# Patient Record
Sex: FEMALE | Race: WHITE | NOT HISPANIC OR LATINO | Employment: FULL TIME | URBAN - METROPOLITAN AREA
[De-identification: names, ages, dates, MRNs, and addresses within clinical notes are randomized per-mention and may not be internally consistent; named-entity substitution may affect disease eponyms.]

---

## 2023-09-13 PROCEDURE — 87077 CULTURE AEROBIC IDENTIFY: CPT | Performed by: FAMILY MEDICINE

## 2023-09-13 PROCEDURE — 87086 URINE CULTURE/COLONY COUNT: CPT | Performed by: FAMILY MEDICINE

## 2023-09-13 PROCEDURE — 87186 SC STD MICRODIL/AGAR DIL: CPT | Performed by: FAMILY MEDICINE

## 2023-09-20 ENCOUNTER — OFFICE VISIT (OUTPATIENT)
Dept: OBSTETRICS AND GYNECOLOGY | Facility: CLINIC | Age: 28
End: 2023-09-20
Payer: COMMERCIAL

## 2023-09-20 ENCOUNTER — PATIENT ROUNDING (BHMG ONLY) (OUTPATIENT)
Dept: OBSTETRICS AND GYNECOLOGY | Facility: CLINIC | Age: 28
End: 2023-09-20
Payer: COMMERCIAL

## 2023-09-20 VITALS
BODY MASS INDEX: 23 KG/M2 | SYSTOLIC BLOOD PRESSURE: 116 MMHG | HEIGHT: 62 IN | DIASTOLIC BLOOD PRESSURE: 80 MMHG | WEIGHT: 125 LBS

## 2023-09-20 DIAGNOSIS — Z31.9 INFERTILITY MANAGEMENT: ICD-10-CM

## 2023-09-20 DIAGNOSIS — Z12.4 SCREENING FOR CERVICAL CANCER: Primary | ICD-10-CM

## 2023-09-20 DIAGNOSIS — Z01.419 WOMEN'S ANNUAL ROUTINE GYNECOLOGICAL EXAMINATION: ICD-10-CM

## 2023-09-20 RX ORDER — PRENATAL VIT,CAL 76/IRON/FOLIC 29 MG-1 MG
1 TABLET ORAL DAILY
Qty: 30 TABLET | Refills: 11 | Status: SHIPPED | OUTPATIENT
Start: 2023-09-20 | End: 2024-09-19

## 2023-09-20 NOTE — PROGRESS NOTES
Gynecologic Annual Exam Note        Gynecologic Exam        Subjective     HPI  Jennifer REYNOLDS is a 28 y.o.  female who presents for annual well woman exam as a new patient. . Patient reports problems with: Patient is currently TTC without success. She has been off birth control for one year and four months and actively trying to get pregnant. . Patient's last menstrual period was 2023 (exact date).. Her periods occur every 25-35 days , lasting 5 days. The flow is moderate.. She reports dysmenorrhea is mild, occurring first  premenstrual and 1-2 days of flow. Partner Status: Marital Status: .  She is sexually active. She has not had new partners.. STD testing recommendations have been explained to the patient and she does not desire STD testing.    Patient states she has been trying to conceive for about 1-1/2 years.  She states her menses have been regular every 26 to 27 days with no dysmenorrhea or dyspareunia.  She has had ovulation tests that are always positive.  Her  is 28 years of age and denies any medical problems.    Additional OB/GYN History   Current contraception: contraceptive methods: None  Desires to: do not start contraception  Thromboembolic Disease: none  Age of menarche: 16    History of STD: no    Last Pap : She states she was due last year and forgot. Results: unknown . HPV: unknown .   Last Completed Pap Smear       This patient has no relevant Health Maintenance data.             History of abnormal Pap smear: no  Gardasil status:completed  Family history of uterine, colon, breast, or ovarian cancer: no  Performs monthly Self-Breast Exam: yes  Exercises Regularly:no  Feelings of Anxiety or Depression: no  Tobacco Usage?: No       Current Outpatient Medications:     sulfamethoxazole-trimethoprim (BACTRIM DS,SEPTRA DS) 800-160 MG per tablet, Take 1 tablet by mouth 2 (Two) Times a Day for 10 days., Disp: 20 tablet, Rfl: 0    prenatal vitamins (PRENATABS RX) 29-1 MG  "tablet tablet, Take 1 tablet by mouth Daily., Disp: 30 tablet, Rfl: 11     Patient denies the need for medication refills today.    OB History          0    Para   0    Term   0       0    AB   0    Living   0         SAB   0    IAB   0    Ectopic   0    Molar   0    Multiple   0    Live Births   0                Health Maintenance   Topic Date Due    Annual Gynecologic Pelvic and Breast Exam  Never done    COVID-19 Vaccine (1) Never done    HPV VACCINES (1 - 2-dose series) Never done    TDAP/TD VACCINES (1 - Tdap) Never done    HEPATITIS C SCREENING  Never done    ANNUAL PHYSICAL  Never done    PAP SMEAR  Never done    INFLUENZA VACCINE  10/01/2023    Pneumococcal Vaccine 0-64  Aged Out       History reviewed. No pertinent past medical history.     Past Surgical History:   Procedure Laterality Date    WISDOM TOOTH EXTRACTION         The additional following portions of the patient's history were reviewed and updated as appropriate: allergies, current medications, past family history, past medical history, past social history, past surgical history, and problem list.    Review of Systems   Constitutional: Negative.    HENT: Negative.     Eyes: Negative.    Respiratory: Negative.     Cardiovascular: Negative.    Gastrointestinal: Negative.    Endocrine: Negative.    Genitourinary:         Infertility    Musculoskeletal: Negative.    Skin: Negative.    Allergic/Immunologic: Negative.    Neurological: Negative.    Hematological: Negative.    Psychiatric/Behavioral: Negative.         I have reviewed and agree with the HPI, ROS, and historical information as entered above.   Ismael De La Cruz MD          Objective   /80 (BP Location: Right arm, Patient Position: Sitting, Cuff Size: Adult)   Ht 157.5 cm (62\")   Wt 56.7 kg (125 lb)   LMP 2023 (Exact Date)   BMI 22.86 kg/m²     Physical Exam  Vitals and nursing note reviewed. Exam conducted with a chaperone present.   Constitutional:       " Appearance: Normal appearance. She is well-developed and normal weight.   HENT:      Head: Normocephalic and atraumatic.   Neck:      Thyroid: No thyroid mass or thyromegaly.   Cardiovascular:      Rate and Rhythm: Normal rate and regular rhythm.      Heart sounds: Normal heart sounds. No murmur heard.  Pulmonary:      Effort: Pulmonary effort is normal. No retractions.      Breath sounds: Normal breath sounds. No wheezing, rhonchi or rales.   Chest:      Chest wall: No mass or tenderness.   Breasts:     Right: Normal. No mass, nipple discharge, skin change or tenderness.      Left: Normal. No mass, nipple discharge, skin change or tenderness.   Abdominal:      General: Bowel sounds are normal.      Palpations: Abdomen is soft. Abdomen is not rigid. There is no mass.      Tenderness: There is no abdominal tenderness. There is no guarding.      Hernia: No hernia is present. There is no hernia in the left inguinal area.   Genitourinary:     General: Normal vulva.      Exam position: Lithotomy position.      Labia:         Right: No rash, tenderness or lesion.         Left: No rash, tenderness or lesion.       Vagina: Normal. No vaginal discharge or lesions.      Cervix: No cervical motion tenderness, discharge, lesion or cervical bleeding.      Uterus: Normal. Not enlarged, not fixed and not tender.       Adnexa:         Right: No mass or tenderness.          Left: No mass or tenderness.        Rectum: No external hemorrhoid.      Comments: No vaginal discharge.  Cervix out lesions or discharge.  Uterus normal size nontender mid position.  Ovaries nonenlarged and nontender.  Musculoskeletal:      Cervical back: Normal range of motion. No muscular tenderness.   Neurological:      Mental Status: She is alert and oriented to person, place, and time.   Psychiatric:         Behavior: Behavior normal.          Assessment and Plan    Problem List Items Addressed This Visit       Screening for cervical cancer - Primary     Overview     Last Pap smear and dental in 2021?    9/20/2023 Pap smear with HPV screen done.         Infertility management    Overview     9/20/2023 trying for 1.5 yrs.  Regular menses; q 26-27; Positive LH tests.   No h/o PID/ chlamydia.  No dysmenorrhea;   Needs S/A         Relevant Orders    Semen Analysis - Semen, Voided       GYN annual well woman exam.  28 years of age.  Menses every 26 to 27 days.  Reviewed pap guidelines.  Pap with HPV screen done.  If negative can repeat in 3 years.  History of infertility x1.5 years.  Appears to be ovulating monthly.  Check semen analysis.  Reviewed monthly self breast exams.  Instructed to call with lumps, pain, or breast discharge.    Reviewed exercise as a preventative health measures.   Preconceptual Counseling - Discussed cystic fibrosis screening, rubella screening, chicken pox immunity, folic acid supplementation and HIV screening.   Reccommended Flu Vaccine in Fall of each year.  RTC in 1 year or PRN with problems  Rx for prenatal vitamins sent to pharmacy.  Return in about 6 months (around 3/20/2024) for Next scheduled follow up.      Ismael De La Cruz MD  09/20/2023

## 2023-09-20 NOTE — PROGRESS NOTES
September 20, 2023    Hello, may I speak with Jennifer REYNOLDS?    My name is MARTHA      I am  with [facility]   [facility]  [address]  formerly [facility] [address] [phone].    Before we get started may I verify your date of birth? 1995    I am calling to officially welcome you to our practice and ask about your recent visit. Is this a good time to talk? yes    Tell me about your visit with us. What things went well?  EVERYTHING WAS GREAT       We're always looking for ways to make our patients' experiences even better. Do you have recommendations on ways we may improve?  no    Overall were you satisfied with your first visit to our practice? yes       I appreciate you taking the time to speak with me today. Is there anything else I can do for you? no      Thank you, and have a great day.

## 2023-09-21 LAB — REF LAB TEST METHOD: NORMAL

## 2023-11-28 ENCOUNTER — TELEPHONE (OUTPATIENT)
Dept: OBSTETRICS AND GYNECOLOGY | Facility: CLINIC | Age: 28
End: 2023-11-28
Payer: COMMERCIAL

## 2023-11-29 ENCOUNTER — INITIAL PRENATAL (OUTPATIENT)
Dept: OBSTETRICS AND GYNECOLOGY | Facility: CLINIC | Age: 28
End: 2023-11-29
Payer: COMMERCIAL

## 2023-11-29 ENCOUNTER — TELEPHONE (OUTPATIENT)
Dept: OBSTETRICS AND GYNECOLOGY | Facility: CLINIC | Age: 28
End: 2023-11-29

## 2023-11-29 VITALS — DIASTOLIC BLOOD PRESSURE: 62 MMHG | WEIGHT: 126 LBS | SYSTOLIC BLOOD PRESSURE: 98 MMHG | BODY MASS INDEX: 23.05 KG/M2

## 2023-11-29 DIAGNOSIS — Z34.91 PRENATAL CARE IN FIRST TRIMESTER: Primary | ICD-10-CM

## 2023-11-29 DIAGNOSIS — Z12.4 SCREENING FOR CERVICAL CANCER: ICD-10-CM

## 2023-11-29 DIAGNOSIS — Z11.8 SCREENING FOR CHLAMYDIAL DISEASE: ICD-10-CM

## 2023-11-29 DIAGNOSIS — Z34.90 PREGNANCY, UNSPECIFIED GESTATIONAL AGE: ICD-10-CM

## 2023-11-29 PROBLEM — O21.9 NAUSEA/VOMITING IN PREGNANCY: Status: ACTIVE | Noted: 2023-11-29

## 2023-11-29 PROCEDURE — 0501F PRENATAL FLOW SHEET: CPT | Performed by: OBSTETRICS & GYNECOLOGY

## 2023-11-29 RX ORDER — PROMETHAZINE HYDROCHLORIDE 25 MG/1
25 TABLET ORAL EVERY 6 HOURS PRN
Qty: 25 TABLET | Refills: 4 | Status: SHIPPED | OUTPATIENT
Start: 2023-11-29

## 2023-11-29 RX ORDER — DOXYLAMINE SUCCINATE AND PYRIDOXINE HYDROCHLORIDE, DELAYED RELEASE TABLETS 10 MG/10 MG 10; 10 MG/1; MG/1
2 TABLET, DELAYED RELEASE ORAL NIGHTLY PRN
Qty: 60 TABLET | Refills: 3 | Status: SHIPPED | OUTPATIENT
Start: 2023-11-29

## 2023-11-29 RX ORDER — VITAMIN C, CALCIUM, IRON, VITAMIN D3, VITAMIN E, THIAMIN, RIBOFLAVIN, NIACINAMIDE, VITAMIN B6, FOLIC ACID, IODINE, ZINC, COPPER, DOCUSATE SODIUM
1 KIT DAILY
Qty: 30 TABLET | Refills: 11 | Status: SHIPPED | OUTPATIENT
Start: 2023-11-29 | End: 2024-11-28

## 2023-11-29 NOTE — TELEPHONE ENCOUNTER
Generic Diclegis not on pt's formulary - PA pending. Update: Per CMM the request was approved for the following time period: 11/29/2023 - 11/29/2024

## 2023-11-29 NOTE — PROGRESS NOTES
Initial ob visit     CC- Here for care of pregnancy        Jennifer REYNOLDS is a 28 y.o. female, , who presents for her first obstetrical visit.  Her last LMP was Patient's last menstrual period was 10/06/2023.. Her NOLBERTO is 2024, by Last Menstrual Period. Current GA is 7w5d.     Initial positive test date : 10-28-23, UPT        Her periods are: regular when not pregnant  Prior obstetric issues: none  Patient's past medical history is significant for:  none .  Family history of genetic issues (includes FOB): none  Prior infections concerning in pregnancy (Rash, fever in last 2 weeks): No  Varicella Hx - unknown   Prior testing for Cystic Fibrosis Carrier or Sickle Cell Trait- no  Prepregnancy BMI - Body mass index is 23.05 kg/m².  History of STD: no  Hx of HSV for patient or partner: no  Ultrasound Today: yes    OB History    Para Term  AB Living   1 0 0 0 0 0   SAB IAB Ectopic Molar Multiple Live Births   0 0 0 0 0 0      # Outcome Date GA Lbr Joss/2nd Weight Sex Delivery Anes PTL Lv   1 Current                Additional Pertinent History   Last Pap : 23 Result: negative HPV: negative     Last Completed Pap Smear            PAP SMEAR (Every 3 Years) Next due on 2023  LIQUID-BASED PAP SMEAR WITH HPV GENOTYPING REGARDLESS OF INTERPRETATION (STEPHEN,COR,MAD)                  History of abnormal Pap smear: no  Family history of uterine, colon, breast, or ovarian cancer: no  Feelings of Anxiety or Depression: no  Tobacco Usage?: No   Alcohol/Drug Use?: NO  Over the age of 35 at delivery: no  Desires Genetic Screening: [id number] with gender  Flu Status:  currently out of flu vaccine    PMH    Current Outpatient Medications:     prenatal vitamins (PRENATABS RX) 29-1 MG tablet tablet, Take 1 tablet by mouth Daily., Disp: 30 tablet, Rfl: 11    doxylamine-pyridoxine (DICLEGIS) 10-10 MG tablet delayed-release EC tablet, Take 2 tablets by mouth At Night As Needed (hyperemesis).,  Disp: 60 tablet, Rfl: 3    Prenat w/o A-FeCbGl-DSS-FA-DHA (CitraNatal DHA) 27-1 & 250 MG tablet, Take 1 tablet by mouth Daily., Disp: 30 tablet, Rfl: 11    promethazine (PHENERGAN) 25 MG tablet, Take 1 tablet by mouth Every 6 (Six) Hours As Needed for Nausea or Vomiting., Disp: 25 tablet, Rfl: 4     History reviewed. No pertinent past medical history.     Past Surgical History:   Procedure Laterality Date    WISDOM TOOTH EXTRACTION         Review of Systems   Review of Systems    Patient Reports: Nausea and vomiting and Fatigue  Patient Denies: Spotting, Heavy bleeding, and Cramping  All systems reviewed and otherwise normal.    I have reviewed and agree with the HPI, ROS, and historical information as entered above.   Ismael De La Cruz MD      BP 98/62   Wt 57.2 kg (126 lb)   LMP 10/06/2023   BMI 23.05 kg/m²     The additional following portions of the patient's history were reviewed and updated as appropriate: allergies, current medications, past family history, past medical history, past social history, past surgical history, and problem list.    Physical Exam  General:  well developed; well nourished  no acute distress   Chest/Respiratory: No labored breathing, normal respiratory effort, normal appearance, no respiratory noises noted  CHEST/RESPIRATORY: clear to auscultation, no wheezes, rales, or rhonchi, no tachypnea, retractions, or cyanosis   Heart:  normal rate, regular rhythm,  no murmurs, rubs, or gallops   Thyroid: normal to inspection and palpation   Breasts:  Not performed.   Abdomen: soft, non-tender; no masses  no umbilical or inguinal hernias are present   Pelvis: Not performed.        Assessment and Plan    Problem List Items Addressed This Visit          11/23    Pregnancy    Overview     28-year-old G1, P0.    Dates consistent with 7-week ultrasound.  Desires cell free DNA screening.            Other    Screening for cervical cancer    Overview     Last Pap smear and dental in 2021?    Pap  smear 9/20/2023 was negative.  HPV high-risk Pool negative.          Other Visit Diagnoses       Prenatal care in first trimester    -  Primary    Relevant Orders    Obstetric Panel    HIV-1 / O / 2 Ag / Antibody    Urine Culture - Urine, Urine, Clean Catch    Urinalysis With Microscopic - Urine, Clean Catch    Chlamydia trachomatis, Neisseria gonorrhoeae, PCR - Urine, Urine, Clean Catch    Urine Drug Screen - Urine, Clean Catch    Screening for chlamydial disease                Pregnancy at 7w5d; dates consistent with ultrasound today.  Nausea vomiting pregnancy.  Rx for Diclegis and Phenergan sent to pharmacy.  Desires cell free DNA screening next visit.  Rx for prenatal vitamins sent to pharmacy.  Reviewed routine prenatal care with the office and educational materials given  Lab(s) Ordered  Discussed options for genetic testing including first trimester nuchal translucency screen, genetic disease carrier testing, quadruple screen, and NIPT  Medication(s) Ordered  Nausea/Vomiting - desires medication.  Options discussed and encouraged to be proactive to avoid constipation if on Zofran.  Patient is on Prenatal vitamins  Return in about 4 weeks (around 12/27/2023) for Next scheduled follow up.      Ismael De La Cruz MD  11/29/2023

## 2023-11-30 LAB
ABO GROUP BLD: NORMAL
AMPHETAMINES UR QL SCN: NEGATIVE NG/ML
APPEARANCE UR: ABNORMAL
BACTERIA #/AREA URNS HPF: ABNORMAL /[HPF]
BARBITURATES UR QL SCN: NEGATIVE NG/ML
BASOPHILS # BLD AUTO: 0 X10E3/UL (ref 0–0.2)
BASOPHILS NFR BLD AUTO: 0 %
BENZODIAZ UR QL SCN: NEGATIVE NG/ML
BILIRUB UR QL STRIP: NEGATIVE
BLD GP AB SCN SERPL QL: NEGATIVE
BZE UR QL SCN: NEGATIVE NG/ML
CANNABINOIDS UR QL SCN: NEGATIVE NG/ML
CASTS URNS QL MICRO: ABNORMAL /LPF
COLOR UR: YELLOW
CREAT UR-MCNC: 271.8 MG/DL (ref 20–300)
CRYSTALS URNS MICRO: ABNORMAL
EOSINOPHIL # BLD AUTO: 0.1 X10E3/UL (ref 0–0.4)
EOSINOPHIL NFR BLD AUTO: 1 %
EPI CELLS #/AREA URNS HPF: >10 /HPF (ref 0–10)
ERYTHROCYTE [DISTWIDTH] IN BLOOD BY AUTOMATED COUNT: 12.2 % (ref 11.7–15.4)
GLUCOSE UR QL STRIP: NEGATIVE
HBV SURFACE AG SERPL QL IA: NEGATIVE
HCT VFR BLD AUTO: 40.6 % (ref 34–46.6)
HCV IGG SERPL QL IA: NON REACTIVE
HGB BLD-MCNC: 13.6 G/DL (ref 11.1–15.9)
HGB UR QL STRIP: NEGATIVE
HIV 1+2 AB+HIV1 P24 AG SERPL QL IA: NON REACTIVE
IMM GRANULOCYTES # BLD AUTO: 0 X10E3/UL (ref 0–0.1)
IMM GRANULOCYTES NFR BLD AUTO: 0 %
KETONES UR QL STRIP: ABNORMAL
LABORATORY COMMENT REPORT: NORMAL
LEUKOCYTE ESTERASE UR QL STRIP: ABNORMAL
LYMPHOCYTES # BLD AUTO: 2 X10E3/UL (ref 0.7–3.1)
LYMPHOCYTES NFR BLD AUTO: 21 %
MCH RBC QN AUTO: 30.3 PG (ref 26.6–33)
MCHC RBC AUTO-ENTMCNC: 33.5 G/DL (ref 31.5–35.7)
MCV RBC AUTO: 90 FL (ref 79–97)
METHADONE UR QL SCN: NEGATIVE NG/ML
MICRO URNS: ABNORMAL
MONOCYTES # BLD AUTO: 0.6 X10E3/UL (ref 0.1–0.9)
MONOCYTES NFR BLD AUTO: 6 %
MUCOUS THREADS URNS QL MICRO: PRESENT
NEUTROPHILS # BLD AUTO: 6.5 X10E3/UL (ref 1.4–7)
NEUTROPHILS NFR BLD AUTO: 72 %
NITRITE UR QL STRIP: NEGATIVE
OPIATES UR QL SCN: NEGATIVE NG/ML
OXYCODONE+OXYMORPHONE UR QL SCN: NEGATIVE NG/ML
PCP UR QL: NEGATIVE NG/ML
PH UR STRIP: 6.5 [PH] (ref 5–7.5)
PH UR: 6.6 [PH] (ref 4.5–8.9)
PLATELET # BLD AUTO: 333 X10E3/UL (ref 150–450)
PROPOXYPH UR QL SCN: NEGATIVE NG/ML
PROT UR QL STRIP: ABNORMAL
RBC # BLD AUTO: 4.49 X10E6/UL (ref 3.77–5.28)
RBC #/AREA URNS HPF: ABNORMAL /HPF (ref 0–2)
RH BLD: POSITIVE
RPR SER QL: NON REACTIVE
RUBV IGG SERPL IA-ACNC: 9.66 INDEX
SP GR UR STRIP: 1.03 (ref 1–1.03)
UNIDENT CRYS URNS QL MICRO: PRESENT
UROBILINOGEN UR STRIP-MCNC: 0.2 MG/DL (ref 0.2–1)
WBC # BLD AUTO: 9.2 X10E3/UL (ref 3.4–10.8)
WBC #/AREA URNS HPF: ABNORMAL /HPF (ref 0–5)

## 2023-12-01 LAB
BACTERIA UR CULT: NO GROWTH
BACTERIA UR CULT: NORMAL

## 2023-12-02 LAB
C TRACH RRNA SPEC QL NAA+PROBE: NEGATIVE
N GONORRHOEA RRNA SPEC QL NAA+PROBE: NEGATIVE

## 2023-12-07 ENCOUNTER — TELEPHONE (OUTPATIENT)
Dept: OBSTETRICS AND GYNECOLOGY | Facility: CLINIC | Age: 28
End: 2023-12-07
Payer: COMMERCIAL

## 2023-12-07 NOTE — TELEPHONE ENCOUNTER
Caller: Jennifer REYNOLDS    Relationship: Self    Best call back number: [phone] (home)      What orders are you requesting (i.e. lab or imaging): LABS    In what timeframe would the patient need to come in: 12-    Where will you receive your lab/imaging services: OFFICE     Additional notes: PT IS PLANNING ON COMING TOMORROW FOR GENDER LABS WOULD LIKE TO MAKE SURE ORDERS ARE PUT IN.  THANK YOU.

## 2023-12-08 ENCOUNTER — LAB (OUTPATIENT)
Dept: OBSTETRICS AND GYNECOLOGY | Facility: CLINIC | Age: 28
End: 2023-12-08
Payer: COMMERCIAL

## 2023-12-08 DIAGNOSIS — Z34.90 PREGNANCY, UNSPECIFIED GESTATIONAL AGE: Primary | ICD-10-CM

## 2023-12-14 ENCOUNTER — TELEPHONE (OUTPATIENT)
Dept: OBSTETRICS AND GYNECOLOGY | Facility: CLINIC | Age: 28
End: 2023-12-14
Payer: COMMERCIAL

## 2023-12-14 NOTE — TELEPHONE ENCOUNTER
Patient calling to discuss when she should expect the Materni 21 results back had drawn on 12/08 notified it can take up to 12 business days

## 2023-12-14 NOTE — TELEPHONE ENCOUNTER
Returned patient's call. Informed her that we will contact her when results are available. She v/u and agreed.

## 2023-12-15 ENCOUNTER — TELEPHONE (OUTPATIENT)
Dept: OBSTETRICS AND GYNECOLOGY | Facility: CLINIC | Age: 28
End: 2023-12-15
Payer: COMMERCIAL

## 2023-12-15 NOTE — TELEPHONE ENCOUNTER
Contacted by Dr. Simons to place orders for CXR, CBC and Covid PCR prior to appointment with Dr. Rosario.    Pt gave VU of results

## 2023-12-27 ENCOUNTER — ROUTINE PRENATAL (OUTPATIENT)
Dept: OBSTETRICS AND GYNECOLOGY | Facility: CLINIC | Age: 28
End: 2023-12-27
Payer: COMMERCIAL

## 2023-12-27 VITALS — DIASTOLIC BLOOD PRESSURE: 78 MMHG | SYSTOLIC BLOOD PRESSURE: 116 MMHG | BODY MASS INDEX: 22.24 KG/M2 | WEIGHT: 121.6 LBS

## 2023-12-27 DIAGNOSIS — Z34.91 PRENATAL CARE IN FIRST TRIMESTER: Primary | ICD-10-CM

## 2023-12-27 DIAGNOSIS — Z34.90 PREGNANCY, UNSPECIFIED GESTATIONAL AGE: ICD-10-CM

## 2023-12-27 DIAGNOSIS — O21.9 NAUSEA/VOMITING IN PREGNANCY: ICD-10-CM

## 2023-12-27 DIAGNOSIS — Z12.4 SCREENING FOR CERVICAL CANCER: ICD-10-CM

## 2023-12-27 LAB
GLUCOSE UR STRIP-MCNC: NEGATIVE MG/DL
PROT UR STRIP-MCNC: NEGATIVE MG/DL

## 2023-12-27 RX ORDER — CALCIUM CARBONATE 500 MG/1
1 TABLET, CHEWABLE ORAL DAILY
COMMUNITY

## 2023-12-27 RX ORDER — ACETAMINOPHEN 500 MG
500 TABLET ORAL EVERY 6 HOURS PRN
COMMUNITY

## 2023-12-27 NOTE — PROGRESS NOTES
OB FOLLOW UP  CC- Here for care of pregnancy        Jennifer REYNOLDS is a 28 y.o.  11w5d patient being seen today for her obstetrical follow up visit. Patient reports severe headaches that make her nauseous. Tylenol sometimes help but not most of the time. She has been vomiting, phenergan helps.     Her prenatal care is complicated by (and status) :   Patient Active Problem List   Diagnosis    Screening for cervical cancer    Infertility management    Pregnancy    Nausea/vomiting in pregnancy       Desires genetic testing?: Yes with Gender-completed  Flu Status: Will give in office today  Ultrasound Today: No    ROS -   Patient Reports : see above  Patient Denies: Loss of Fluid, Vaginal Spotting, and Vision Changes  Fetal Movement :  too early  All other systems reviewed and are negative.     The additional following portions of the patient's history were reviewed and updated as appropriate: allergies, current medications, past family history, past medical history, past social history, past surgical history, and problem list.    I have reviewed and agree with the HPI, ROS, and historical information as entered above.   Ismael De La Cruz MD          /78   Wt 55.2 kg (121 lb 9.6 oz)   LMP 10/06/2023   BMI 22.24 kg/m²         EXAM:     Prenatal Vitals  BP: 116/78  Weight: 55.2 kg (121 lb 9.6 oz)   Fetal Heart Rate: 166                  Assessment and Plan    Problem List Items Addressed This Visit              Nausea/vomiting in pregnancy    Overview     Rx for Phenergan and Diclegis sent to pharmacy.         Pregnancy    Overview     28-year-old G1, P0.    : Ismael  Dates consistent with 7-week ultrasound.  Cell free DNA screen was low risk and consistent with male.            Other    Screening for cervical cancer    Overview     Last Pap smear and dental in ?    Pap smear 2023 was negative.  HPV high-risk Pool negative.          Other Visit Diagnoses       Prenatal care in  first trimester    -  Primary    Relevant Orders    POC Urinalysis Dipstick    Fluzone (or Fluarix & Flulaval for VFC) >6 Mos (5560-3024) (Completed)            Pregnancy at 11w5d; AFP screen next visit.  Nausea vomiting improving.  Declines Rx for Zofran.  Did not get Diclegis filled.  Labs reviewed from New OB Visit.  Counseled on genetic testing, carrier status and option for NT screen  Activity and Exercise discussed.  Nausea/Vomiting - desires medication.  Options discussed of Diclegis/Bonjesta, Promethazine, and Zofran  Patient is on Prenatal vitamins  Return in about 4 weeks (around 1/24/2024) for Next scheduled follow up.    Ismael De La Cruz MD  12/27/2023

## 2024-01-22 ENCOUNTER — ROUTINE PRENATAL (OUTPATIENT)
Dept: OBSTETRICS AND GYNECOLOGY | Facility: CLINIC | Age: 29
End: 2024-01-22
Payer: COMMERCIAL

## 2024-01-22 VITALS — WEIGHT: 121.2 LBS | DIASTOLIC BLOOD PRESSURE: 64 MMHG | BODY MASS INDEX: 22.17 KG/M2 | SYSTOLIC BLOOD PRESSURE: 102 MMHG

## 2024-01-22 DIAGNOSIS — Z12.4 SCREENING FOR CERVICAL CANCER: ICD-10-CM

## 2024-01-22 DIAGNOSIS — O21.9 NAUSEA/VOMITING IN PREGNANCY: ICD-10-CM

## 2024-01-22 DIAGNOSIS — Z3A.15 15 WEEKS GESTATION OF PREGNANCY: Primary | ICD-10-CM

## 2024-01-22 LAB
EXPIRATION DATE: 0
GLUCOSE UR STRIP-MCNC: NEGATIVE MG/DL
Lab: 0
PROT UR STRIP-MCNC: NEGATIVE MG/DL

## 2024-01-22 PROCEDURE — 0502F SUBSEQUENT PRENATAL CARE: CPT | Performed by: OBSTETRICS & GYNECOLOGY

## 2024-01-22 RX ORDER — PRENATAL VIT NO.126/IRON/FOLIC 28MG-0.8MG
TABLET ORAL DAILY
COMMUNITY

## 2024-01-22 NOTE — PROGRESS NOTES
OB FOLLOW UP  CC- Here for care of pregnancy        Jennifer REYNOLDS is a 28 y.o.  15w3d patient being seen today for her obstetrical follow up visit. Patient reports no complaints.    Her prenatal care is complicated by (and status) :   Patient Active Problem List   Diagnosis    Screening for cervical cancer    Infertility management    Pregnancy    Nausea/vomiting in pregnancy       Flu Status: Already given in current flu season  Ultrasound Today: No    AFP: desires    ROS -   Patient Reports : No Problems  Patient Denies: Loss of Fluid, Vaginal Spotting, Vision Changes, Headaches, Nausea , Vomiting , Contractions, and Epigastric pain  Fetal Movement : absent  All other systems reviewed and are negative.       The additional following portions of the patient's history were reviewed and updated as appropriate: allergies, current medications, past family history, past medical history, past social history, past surgical history, and problem list.      I have reviewed and agree with the HPI, ROS, and historical information as entered above.   Ismael De La Cruz MD          EXAM:     Prenatal Vitals  BP: 102/64  Weight: 55 kg (121 lb 3.2 oz)   Fetal Heart Rate: 148   Pelvic Exam:        Urine Glucose Read-only: Negative  Urine Protein Read-only: Negative           Assessment and Plan    Problem List Items Addressed This Visit              Nausea/vomiting in pregnancy    Overview     Rx for Phenergan and Diclegis sent to pharmacy.         Pregnancy - Primary    Overview     28-year-old G1, P0.    : Ismael  Dates consistent with 7-week ultrasound.  Cell free DNA screen was low risk and consistent with male.         Relevant Orders    POC Protein, Urine, Qualitative, Dipstick (Completed)    POC Glucose, Urine, Qualitative, Dipstick (Completed)    Alpha Fetoprotein, Maternal    US Ob 14 + Weeks Single or First Gestation    TSH       Other    Screening for cervical cancer    Overview     Last Pap smear  and dental in 2021?    Pap smear 9/20/2023 was negative.  HPV high-risk Pool negative.            Pregnancy at 15w3d; AFP only today.  Fetal status reassuring.   Counseled on MSAFP alone in relation to OTD and placental issues.    Anatomy scan next visit.   Nausea and vomiting improved.  No longer taking Diclegis.  Request thyroid screening and due to maternal history of thyroid disease during pregnancy.  Activity and Exercise discussed.  Patient is on Prenatal vitamins  Return in about 5 weeks (around 2/26/2024) for Anomaly scan ultrasound.    Ismael De La Cruz MD  01/22/2024

## 2024-01-23 LAB — TSH SERPL DL<=0.005 MIU/L-ACNC: 0.65 UIU/ML (ref 0.45–4.5)

## 2024-01-24 LAB
AFP INTERP SERPL-IMP: NORMAL
AFP INTERP SERPL-IMP: NORMAL
AFP MOM SERPL: 0.68
AFP SERPL-MCNC: 24.4 NG/ML
AGE AT DELIVERY: 26.9 YR
GA METHOD: NORMAL
GA: 15.4 WEEKS
IDDM PATIENT QL: NO
LABORATORY COMMENT REPORT: NORMAL
MULTIPLE PREGNANCY: NO
NEURAL TUBE DEFECT RISK FETUS: NORMAL %
RESULT: NORMAL

## 2024-02-26 ENCOUNTER — ROUTINE PRENATAL (OUTPATIENT)
Dept: OBSTETRICS AND GYNECOLOGY | Facility: CLINIC | Age: 29
End: 2024-02-26
Payer: COMMERCIAL

## 2024-02-26 VITALS — SYSTOLIC BLOOD PRESSURE: 102 MMHG | BODY MASS INDEX: 22.86 KG/M2 | DIASTOLIC BLOOD PRESSURE: 64 MMHG | WEIGHT: 125 LBS

## 2024-02-26 DIAGNOSIS — Z34.02 ENCOUNTER FOR SUPERVISION OF NORMAL FIRST PREGNANCY IN SECOND TRIMESTER: Primary | ICD-10-CM

## 2024-02-26 DIAGNOSIS — Z12.4 SCREENING FOR CERVICAL CANCER: ICD-10-CM

## 2024-02-26 DIAGNOSIS — O21.9 NAUSEA/VOMITING IN PREGNANCY: ICD-10-CM

## 2024-02-26 DIAGNOSIS — Z3A.20 20 WEEKS GESTATION OF PREGNANCY: ICD-10-CM

## 2024-02-26 LAB
EXPIRATION DATE: 0
GLUCOSE UR STRIP-MCNC: NEGATIVE MG/DL
Lab: 0
PROT UR STRIP-MCNC: NEGATIVE MG/DL

## 2024-02-26 PROCEDURE — 0502F SUBSEQUENT PRENATAL CARE: CPT | Performed by: OBSTETRICS & GYNECOLOGY

## 2024-02-26 NOTE — PROGRESS NOTES
OB FOLLOW UP  CC- Here for care of pregnancy        Jennifer REYNOLDS is a 28 y.o.  20w3d patient being seen today for her obstetrical follow up visit. Patient reports bilateral breast leaking X 1 week, denies pain.      Her prenatal care is complicated by (and status) :   Patient Active Problem List   Diagnosis    Screening for cervical cancer    Infertility management    Pregnancy    Nausea/vomiting in pregnancy       Flu Status: Already given in current flu season  Ultrasound Today: Yes  AFP was already completed and was normal.    ROS -     Patient Denies: leaking of fluid, vaginal bleeding, dysuria, excessive vomiting, and more than 6 contractions per hour  Fetal Movement : Yes  All other systems reviewed and are negative.       The additional following portions of the patient's history were reviewed and updated as appropriate: allergies, current medications, past family history, past medical history, past social history, past surgical history, and problem list.      I have reviewed and agree with the HPI, ROS, and historical information as entered above.   Ismael De La Cruz MD      /64   Wt 56.7 kg (125 lb)   LMP 10/06/2023   BMI 22.86 kg/m²       EXAM:     Prenatal Vitals  BP: 102/64  Weight: 56.7 kg (125 lb)   Fetal Heart Rate: 145 us          Urine Glucose Read-only: Negative  Urine Protein Read-only: Negative       Assessment and Plan    Problem List Items Addressed This Visit       Nausea/vomiting in pregnancy    Overview     Rx for Phenergan and Diclegis sent to pharmacy.    2024 nausea and vomiting improved.         Pregnancy    Overview     28-year-old G1, P0.    : Ismael  Dates consistent with 7-week ultrasound.  Cell free DNA screen was low risk and consistent with male.  aFP screen negative         Relevant Orders    US Ob Follow Up Transabdominal Approach    Screening for cervical cancer    Overview     Last Pap smear and dental in ?    Pap smear 2023 was  negative.  HPV high-risk Pool negative.          Other Visit Diagnoses       Encounter for supervision of normal first pregnancy in second trimester    -  Primary    Relevant Orders    POC Protein, Urine, Qualitative, Dipstick (Completed)    POC Glucose, Urine, Qualitative, Dipstick (Completed)            Pregnancy at 20w3d  Anatomy scan today is incomplete, follow up in 4 weeks for additional views. Anatomy that was visualized was within normal limits. and needs follow-up views of heart.  Fetal status reassuring.   Activity and Exercise discussed.  Patient is on Prenatal vitamins  Nausea and vomiting has resolved.  No longer taking medications.  Return in about 4 weeks (around 3/25/2024) for Follow-up ultrasound.      Ismael De La Cruz MD  02/26/2024

## 2024-03-25 ENCOUNTER — ROUTINE PRENATAL (OUTPATIENT)
Dept: OBSTETRICS AND GYNECOLOGY | Facility: CLINIC | Age: 29
End: 2024-03-25
Payer: COMMERCIAL

## 2024-03-25 VITALS — DIASTOLIC BLOOD PRESSURE: 90 MMHG | SYSTOLIC BLOOD PRESSURE: 112 MMHG | WEIGHT: 128 LBS | BODY MASS INDEX: 23.41 KG/M2

## 2024-03-25 DIAGNOSIS — Z34.03 ENCOUNTER FOR SUPERVISION OF NORMAL FIRST PREGNANCY IN THIRD TRIMESTER: Primary | ICD-10-CM

## 2024-03-25 DIAGNOSIS — O21.9 NAUSEA/VOMITING IN PREGNANCY: ICD-10-CM

## 2024-03-25 DIAGNOSIS — Z3A.24 24 WEEKS GESTATION OF PREGNANCY: ICD-10-CM

## 2024-03-25 LAB
EXPIRATION DATE: 0
GLUCOSE UR STRIP-MCNC: NEGATIVE MG/DL
Lab: 0
PROT UR STRIP-MCNC: NEGATIVE MG/DL

## 2024-03-25 PROCEDURE — 0502F SUBSEQUENT PRENATAL CARE: CPT | Performed by: OBSTETRICS & GYNECOLOGY

## 2024-03-25 NOTE — PROGRESS NOTES
OB FOLLOW UP  CC- Here for care of pregnancy        Jennifer REYNOLDS is a 28 y.o.  24w3d patient being seen today for her obstetrical follow up visit. Patient reports no complaints.     Her prenatal care is complicated by (and status) :   Patient Active Problem List   Diagnosis    Screening for cervical cancer    Infertility management    Pregnancy    Nausea/vomiting in pregnancy       Flu Status: Already given in current flu season  Ultrasound Today: Yes  Reviewed 1 hr glucose testing and TDAP next visit.    ROS -   Patient Denies: leaking of fluid, vaginal bleeding, dysuria, excessive vomiting, and more than 6 contractions per hour  Fetal Movement : normal  All other systems reviewed and are negative.       The additional following portions of the patient's history were reviewed and updated as appropriate: allergies, current medications, past family history, past medical history, past social history, past surgical history, and problem list.      I have reviewed and agree with the HPI, ROS, and historical information as entered above.   Ismael De La Cruz MD      /90   Wt 58.1 kg (128 lb)   LMP 10/06/2023   BMI 23.41 kg/m²       EXAM:     Prenatal Vitals  BP: 112/90  Weight: 58.1 kg (128 lb)   Fetal Heart Rate: 134 US               Urine Glucose Read-only: Negative  Urine Protein Read-only: Negative       Assessment and Plan    Problem List Items Addressed This Visit       Nausea/vomiting in pregnancy    Overview     Rx for Phenergan and Diclegis sent to pharmacy.    2024 nausea and vomiting improved.         Pregnancy    Overview     28-year-old G1, P0.    : Ismael  Dates consistent with 7-week ultrasound.  Cell free DNA screen was low risk and consistent with male.  aFP screen negative  TSH in low normal range.  Can repeat at 28 weeks.          Other Visit Diagnoses       Encounter for supervision of normal first pregnancy in third trimester    -  Primary    Relevant Orders    POC  Protein, Urine, Qualitative, Dipstick (Completed)    POC Glucose, Urine, Qualitative, Dipstick (Completed)            Pregnancy at 24w3d  Fetal status reassuring.  anatomy scan completed today and within normal limits.  1 hour gtt, CBC, Antibody screen, TDAP, and RPR next visit. Instructions given  Discussed/encouraged TDAP vaccination after 28 weeks  Activity and Exercise discussed.  Return in about 4 weeks (around 4/22/2024) for One hour Glucose Screen.      Ismael De La Cruz MD  03/25/2024

## 2024-04-22 ENCOUNTER — ROUTINE PRENATAL (OUTPATIENT)
Dept: OBSTETRICS AND GYNECOLOGY | Facility: CLINIC | Age: 29
End: 2024-04-22
Payer: COMMERCIAL

## 2024-04-22 VITALS — BODY MASS INDEX: 24.98 KG/M2 | SYSTOLIC BLOOD PRESSURE: 116 MMHG | DIASTOLIC BLOOD PRESSURE: 60 MMHG | WEIGHT: 136.6 LBS

## 2024-04-22 DIAGNOSIS — Z34.90 PREGNANCY, UNSPECIFIED GESTATIONAL AGE: ICD-10-CM

## 2024-04-22 DIAGNOSIS — Z34.03 ENCOUNTER FOR SUPERVISION OF NORMAL FIRST PREGNANCY IN THIRD TRIMESTER: Primary | ICD-10-CM

## 2024-04-22 LAB
EXPIRATION DATE: 0
GLUCOSE UR STRIP-MCNC: NEGATIVE MG/DL
Lab: 0
PROT UR STRIP-MCNC: NEGATIVE MG/DL

## 2024-04-22 PROCEDURE — 90715 TDAP VACCINE 7 YRS/> IM: CPT | Performed by: OBSTETRICS & GYNECOLOGY

## 2024-04-22 PROCEDURE — 0502F SUBSEQUENT PRENATAL CARE: CPT | Performed by: OBSTETRICS & GYNECOLOGY

## 2024-04-22 PROCEDURE — 90471 IMMUNIZATION ADMIN: CPT | Performed by: OBSTETRICS & GYNECOLOGY

## 2024-04-22 NOTE — PROGRESS NOTES
OB FOLLOW UP  CC- Here for care of pregnancy        Jennifer REYNOLDS is a 28 y.o.  28w3d patient being seen today for her obstetrical follow up. Patient reports bilateral lower extremities have starting swelling at night.     Patient undergoing Glucola testing today. She is due for her testing at 2:05 pm.       MBT: A+  Rhogam: is not indicated.  28 week packet: reviewed with patient   TDAP: given today  Flu Status: Already given in current flu season  Ultrasound Today: No    Her prenatal care is complicated by (and status) :   Patient Active Problem List   Diagnosis    Screening for cervical cancer    Infertility management    Pregnancy    Nausea/vomiting in pregnancy         ROS -   Patient Denies: Loss of Fluid, Vaginal Spotting, Vision Changes, Headaches, Nausea , Vomiting , Contractions, Epigastric pain, and skin itching  Fetal Movement : normal    The additional following portions of the patient's history were reviewed and updated as appropriate: allergies, current medications, past family history, past medical history, past social history, past surgical history, and problem list.    I have reviewed and agree with the HPI, ROS, and historical information as entered above.   Ismael De La Cruz MD      /60   Wt 62 kg (136 lb 9.6 oz)   LMP 10/06/2023   BMI 24.98 kg/m²         EXAM:     Prenatal Vitals  BP: 116/60  Weight: 62 kg (136 lb 9.6 oz)   Fetal Heart Rate: 145      Fundal Height (cm): 28 cm        Urine Glucose Read-only: Negative  Urine Protein Read-only: Negative         Assessment and Plan    Problem List Items Addressed This Visit       Pregnancy    Overview     28-year-old G1, P0.    : Ismael  Dates consistent with 7-week ultrasound.  Cell free DNA screen was low risk and consistent with male.  aFP screen negative  TSH in low normal range.  Can repeat at 28 weeks.         Relevant Orders    TSH     Other Visit Diagnoses       Encounter for supervision of normal first pregnancy  in third trimester    -  Primary    Relevant Orders    POC Protein, Urine, Qualitative, Dipstick (Completed)    POC Glucose, Urine, Qualitative, Dipstick (Completed)    CBC (No Diff)    Gestational Screen 1 Hr ([facility])    Antibody Screen    RPR            Pregnancy at 28w3d  1 hr Glucola, CBC, RPR. Antibody screen and TDAP today  Fetal movement/PTL or Labor precautions  Patient is on Prenatal vitamins  Activity and Exercise discussed.  Return in about 4 weeks (around 5/20/2024) for Next scheduled follow up.        Ismael De La Cruz MD  04/22/2024

## 2024-04-23 LAB
BLD GP AB SCN SERPL QL: NEGATIVE
ERYTHROCYTE [DISTWIDTH] IN BLOOD BY AUTOMATED COUNT: 11.8 % (ref 12.3–15.4)
GLUCOSE 1H P 50 G GLC PO SERPL-MCNC: 119 MG/DL (ref 65–139)
HCT VFR BLD AUTO: 33.4 % (ref 34–46.6)
HGB BLD-MCNC: 11.1 G/DL (ref 12–15.9)
MCH RBC QN AUTO: 29.7 PG (ref 26.6–33)
MCHC RBC AUTO-ENTMCNC: 33.2 G/DL (ref 31.5–35.7)
MCV RBC AUTO: 89.3 FL (ref 79–97)
PLATELET # BLD AUTO: 282 10*3/MM3 (ref 140–450)
RBC # BLD AUTO: 3.74 10*6/MM3 (ref 3.77–5.28)
RPR SER QL: NON REACTIVE
TSH SERPL DL<=0.005 MIU/L-ACNC: 1.94 UIU/ML (ref 0.27–4.2)
WBC # BLD AUTO: 10.71 10*3/MM3 (ref 3.4–10.8)

## 2024-04-26 ENCOUNTER — TELEPHONE (OUTPATIENT)
Dept: OBSTETRICS AND GYNECOLOGY | Facility: CLINIC | Age: 29
End: 2024-04-26
Payer: COMMERCIAL

## 2024-05-20 ENCOUNTER — ROUTINE PRENATAL (OUTPATIENT)
Dept: OBSTETRICS AND GYNECOLOGY | Facility: CLINIC | Age: 29
End: 2024-05-20
Payer: COMMERCIAL

## 2024-05-20 VITALS — WEIGHT: 139 LBS | SYSTOLIC BLOOD PRESSURE: 102 MMHG | DIASTOLIC BLOOD PRESSURE: 60 MMHG | BODY MASS INDEX: 25.42 KG/M2

## 2024-05-20 DIAGNOSIS — Z34.93 PRENATAL CARE IN THIRD TRIMESTER: Primary | ICD-10-CM

## 2024-05-20 LAB
GLUCOSE UR STRIP-MCNC: NEGATIVE MG/DL
PROT UR STRIP-MCNC: NEGATIVE MG/DL

## 2024-05-20 PROCEDURE — 0502F SUBSEQUENT PRENATAL CARE: CPT | Performed by: OBSTETRICS & GYNECOLOGY

## 2024-05-20 NOTE — PROGRESS NOTES
OB FOLLOW UP  CC- Here for care of pregnancy        Jennifer REYNOLDS is a 28 y.o.  32w3d patient being seen today for her obstetrical follow up visit. Patient reports that she is leaving on a trip to [place] on Saturday and needs a letter to fly.     Her prenatal care is complicated by (and status) :    Patient Active Problem List   Diagnosis    Screening for cervical cancer    Infertility management    Pregnancy    Nausea/vomiting in pregnancy         TDAP status: received at last visit  Rhogam status: was not indicated  28 week labs: Reviewed  Ultrasound Today: No  Non Stress Test: No.      ROS -   Patient Denies: Loss of Fluid, Vaginal Spotting, Vision Changes, Headaches, Nausea , Vomiting , Contractions, Epigastric pain, and skin itching  Fetal Movement : normal  All other systems reviewed and are negative.       The additional following portions of the patient's history were reviewed and updated as appropriate: allergies, current medications, past family history, past medical history, past social history, past surgical history, and problem list.    I have reviewed and agree with the HPI, ROS, and historical information as entered above.   Ismael De La Cruz MD      /60   Wt 63 kg (139 lb)   LMP 10/06/2023   BMI 25.42 kg/m²         EXAM:     Prenatal Vitals  BP: 102/60  Weight: 63 kg (139 lb)   Fetal Heart Rate: 127      Fundal Height (cm): 32 cm        Urine Glucose Read-only: Negative  Urine Protein Read-only: Negative           Assessment and Plan    Problem List Items Addressed This Visit    None  Visit Diagnoses       Prenatal care in third trimester    -  Primary    Relevant Orders    POC Urinalysis Dipstick (Completed)            Pregnancy at 32w3d; 28-week labs were normal.  Fetal status reassuring.  28 week labs reviewed.    Activity and Exercise discussed.  Fetal movement/PTL or Labor precautions  Patient is on Prenatal vitamins  Return in about 2 weeks (around 6/3/2024) for Next  scheduled follow up.    Ismale De La Cruz MD  05/20/2024

## 2024-06-03 ENCOUNTER — ROUTINE PRENATAL (OUTPATIENT)
Dept: OBSTETRICS AND GYNECOLOGY | Facility: CLINIC | Age: 29
End: 2024-06-03
Payer: COMMERCIAL

## 2024-06-03 VITALS — SYSTOLIC BLOOD PRESSURE: 118 MMHG | BODY MASS INDEX: 25.9 KG/M2 | DIASTOLIC BLOOD PRESSURE: 78 MMHG | WEIGHT: 141.6 LBS

## 2024-06-03 DIAGNOSIS — Z34.90 PREGNANCY, UNSPECIFIED GESTATIONAL AGE: Primary | ICD-10-CM

## 2024-06-03 DIAGNOSIS — O21.9 NAUSEA/VOMITING IN PREGNANCY: ICD-10-CM

## 2024-06-03 DIAGNOSIS — Z34.03 ENCOUNTER FOR SUPERVISION OF NORMAL FIRST PREGNANCY IN THIRD TRIMESTER: ICD-10-CM

## 2024-06-03 LAB
GLUCOSE UR STRIP-MCNC: NEGATIVE MG/DL
PROT UR STRIP-MCNC: NEGATIVE MG/DL

## 2024-06-03 PROCEDURE — 0502F SUBSEQUENT PRENATAL CARE: CPT | Performed by: OBSTETRICS & GYNECOLOGY

## 2024-06-03 NOTE — PROGRESS NOTES
OB FOLLOW UP  CC- Here for care of pregnancy        Jennifer REYNOLDS is a 28 y.o.  34w3d patient being seen today for her obstetrical follow up visit. Patient reports angelica hinkle contractions that started a couple of days ago.     Her prenatal care is complicated by (and status) : see below.  Patient Active Problem List   Diagnosis    Screening for cervical cancer    Infertility management    Pregnancy    Nausea/vomiting in pregnancy       Flu Status: Already given in current flu season  Ultrasound Today: No  Non Stress Test: No.    ROS -   Patient Denies: Loss of Fluid, Vaginal Spotting, Vision Changes, Headaches, Nausea , Vomiting , Contractions, Epigastric pain, and skin itching  Fetal Movement : normal  All other systems reviewed and are negative.       The additional following portions of the patient's history were reviewed and updated as appropriate: allergies, current medications, past family history, past medical history, past social history, past surgical history, and problem list.    I have reviewed and agree with the HPI, ROS, and historical information as entered above.   Ismael De La Cruz MD      /78   Wt 64.2 kg (141 lb 9.6 oz)   LMP 10/06/2023   BMI 25.90 kg/m²       EXAM:     Prenatal Vitals  BP: 118/78  Weight: 64.2 kg (141 lb 9.6 oz)                   Urine Glucose Read-only: Negative  Urine Protein Read-only: Negative           Assessment and Plan    Problem List Items Addressed This Visit       Nausea/vomiting in pregnancy    Overview     Rx for Phenergan and Diclegis sent to pharmacy.    2024 nausea and vomiting improved.         Pregnancy - Primary    Overview     28-year-old G1, P0.    : Ismael  Dates consistent with 7-week ultrasound.  Cell free DNA screen was low risk and consistent with male.  aFP screen negative  TSH in low normal range.  Can repeat at 28 weeks.          Other Visit Diagnoses       Encounter for supervision of normal first pregnancy in third  trimester        Relevant Orders    POC Urinalysis Dipstick (Completed)    US Ob Follow Up Transabdominal Approach            Pregnancy at 34w3d  Fetal status reassuring.   Activity and Exercise discussed.  Fetal movement/PTL or Labor precautions  GBS next visit  Return in about 2 weeks (around 6/17/2024) for GROWTH U/S.    Ismael De La Cruz MD  06/03/2024

## 2024-06-11 ENCOUNTER — TELEPHONE (OUTPATIENT)
Dept: OBSTETRICS AND GYNECOLOGY | Facility: CLINIC | Age: 29
End: 2024-06-11
Payer: COMMERCIAL

## 2024-06-17 ENCOUNTER — ROUTINE PRENATAL (OUTPATIENT)
Dept: OBSTETRICS AND GYNECOLOGY | Facility: CLINIC | Age: 29
End: 2024-06-17
Payer: COMMERCIAL

## 2024-06-17 ENCOUNTER — LAB (OUTPATIENT)
Dept: LAB | Facility: HOSPITAL | Age: 29
End: 2024-06-17
Payer: COMMERCIAL

## 2024-06-17 VITALS — DIASTOLIC BLOOD PRESSURE: 82 MMHG | WEIGHT: 146.6 LBS | SYSTOLIC BLOOD PRESSURE: 120 MMHG | BODY MASS INDEX: 26.81 KG/M2

## 2024-06-17 DIAGNOSIS — Z34.03 ENCOUNTER FOR SUPERVISION OF NORMAL FIRST PREGNANCY IN THIRD TRIMESTER: ICD-10-CM

## 2024-06-17 DIAGNOSIS — Z12.4 SCREENING FOR CERVICAL CANCER: ICD-10-CM

## 2024-06-17 DIAGNOSIS — Z34.90 PREGNANCY, UNSPECIFIED GESTATIONAL AGE: Primary | ICD-10-CM

## 2024-06-17 DIAGNOSIS — O36.63X0 EXCESSIVE FETAL GROWTH AFFECTING MANAGEMENT OF PREGNANCY IN THIRD TRIMESTER, SINGLE OR UNSPECIFIED FETUS: ICD-10-CM

## 2024-06-17 PROCEDURE — 87081 CULTURE SCREEN ONLY: CPT

## 2024-06-17 PROCEDURE — 0502F SUBSEQUENT PRENATAL CARE: CPT | Performed by: OBSTETRICS & GYNECOLOGY

## 2024-06-17 NOTE — PROGRESS NOTES
OB FOLLOW UP  CC- Here for care of pregnancy        Jennifer REYNOLDS is a 28 y.o.  36w3d patient being seen today for her obstetrical follow up visit. Patient reports visual changes, seeing spots over the past month at random times, especially during the heat of the day.     Her prenatal care is complicated by (and status) : see below.  Patient Active Problem List   Diagnosis    Screening for cervical cancer    Infertility management    Pregnancy    Nausea/vomiting in pregnancy    Excessive fetal growth affecting management of pregnancy in third trimester       GBS Status: Done Today. She is not allergic to PCN.    No Known Allergies       Flu Status: Already given in current flu season  Her Delivery Plan is: Undecided    US today: yes  Non Stress Test: No.    ROS -   Patient Denies: Loss of Fluid, Vaginal Spotting, Headaches, Nausea , Vomiting , Contractions, Epigastric pain, and skin itching  Fetal Movement : normal  All other systems reviewed and are negative.       The additional following portions of the patient's history were reviewed and updated as appropriate: allergies, current medications, past family history, past medical history, past social history, past surgical history, and problem list.    I have reviewed and agree with the HPI, ROS, and historical information as entered above.   Ismael De La Cruz MD        EXAM:     Prenatal Vitals  BP: 138/82  Weight: 66.5 kg (146 lb 9.6 oz)   Fetal Heart Rate: 129 US       Dilation/Effacement/Station  Dilation: Closed  Effacement (%): 60  Station: -2                  Assessment and Plan    Problem List Items Addressed This Visit       Excessive fetal growth affecting management of pregnancy in third trimester    Overview     Ultrasound 2024 at 36 weeks.  EFW at 75%; AC 96%.  EVELYN 18.9.  Limit carbs and sugars.         Pregnancy - Primary    Overview     28-year-old G1, P0.    : Ismael  Dates consistent with 7-week ultrasound.  Cell free DNA screen  was low risk and consistent with male.  aFP screen negative  TSH in low normal range.  Can repeat at 28 weeks.         Screening for cervical cancer    Overview     Last Pap smear in 2021?    Pap smear 9/20/2023 was negative.  HPV high-risk Pool negative.            Pregnancy at 36w3d; group B strep today.  LGA; Ultrasound today.  EFW at 75%; AC 96%.  EVELYN 18.9.  Limit carbs and sugars.  Fetal status reassuring.   Reviewed Pre-eclampsia signs/symptoms  Discussed IOL options with patient. Pt. desires IOL at 39 weeks.   Blood pressure was normal.  Delivery options reviewed with patient  Signs of labor reviewed  Kick counts reviewed  Activity and Exercise discussed.  Return in about 1 week (around 6/24/2024) for Next scheduled follow up.    Ismael De La Cruz MD  06/17/2024

## 2024-06-19 ENCOUNTER — TELEPHONE (OUTPATIENT)
Dept: OBSTETRICS AND GYNECOLOGY | Facility: CLINIC | Age: 29
End: 2024-06-19
Payer: COMMERCIAL

## 2024-06-20 LAB — BACTERIA SPEC AEROBE CULT: NORMAL

## 2024-06-24 ENCOUNTER — ROUTINE PRENATAL (OUTPATIENT)
Dept: OBSTETRICS AND GYNECOLOGY | Facility: CLINIC | Age: 29
End: 2024-06-24
Payer: COMMERCIAL

## 2024-06-24 VITALS — BODY MASS INDEX: 26.45 KG/M2 | SYSTOLIC BLOOD PRESSURE: 116 MMHG | WEIGHT: 144.6 LBS | DIASTOLIC BLOOD PRESSURE: 70 MMHG

## 2024-06-24 DIAGNOSIS — Z34.90 PREGNANCY, UNSPECIFIED GESTATIONAL AGE: ICD-10-CM

## 2024-06-24 DIAGNOSIS — O36.63X0 EXCESSIVE FETAL GROWTH AFFECTING MANAGEMENT OF PREGNANCY IN THIRD TRIMESTER, SINGLE OR UNSPECIFIED FETUS: ICD-10-CM

## 2024-06-24 DIAGNOSIS — O21.9 NAUSEA/VOMITING IN PREGNANCY: ICD-10-CM

## 2024-06-24 DIAGNOSIS — Z34.03 ENCOUNTER FOR SUPERVISION OF NORMAL FIRST PREGNANCY IN THIRD TRIMESTER: Primary | ICD-10-CM

## 2024-06-24 LAB
GLUCOSE UR STRIP-MCNC: NEGATIVE MG/DL
PROT UR STRIP-MCNC: NEGATIVE MG/DL

## 2024-06-24 PROCEDURE — 0502F SUBSEQUENT PRENATAL CARE: CPT | Performed by: OBSTETRICS & GYNECOLOGY

## 2024-06-24 NOTE — PROGRESS NOTES
"    OB FOLLOW UP  CC- Here for care of pregnancy        Jennifer Garcia is a 28 y.o.  37w3d patient being seen today for her obstetrical follow up visit. Patient reports \"intense\" angelica hinkle contractions that started this am, reports about 4-5 ctx an hour and lower back pain.     Her prenatal care is complicated by (and status) : see below.  Patient Active Problem List   Diagnosis    Screening for cervical cancer    Infertility management    Pregnancy    Nausea/vomiting in pregnancy    Excessive fetal growth affecting management of pregnancy in third trimester       GBS Status: Neg  Group B Strep Culture   Date Value Ref Range Status   2024 No Group B Streptococcus Isolated at 2 days  Final         No Known Allergies       Flu Status: Already given in current flu season  Her Delivery Plan is: Desires IOL at 39wks. Scheduled  24   today: no  Non Stress Test: No.    ROS -   Patient Denies: Loss of Fluid, Vaginal Spotting, Vision Changes, Headaches, Nausea , Vomiting , Epigastric pain, and skin itching  Fetal Movement : normal  All other systems reviewed and are negative.       The additional following portions of the patient's history were reviewed and updated as appropriate: allergies, current medications, past family history, past medical history, past social history, past surgical history, and problem list.    I have reviewed and agree with the HPI, ROS, and historical information as entered above.   Ismael De La Cruz MD        EXAM:     Prenatal Vitals  BP: 116/70  Weight: 65.6 kg (144 lb 9.6 oz)   Fetal Heart Rate: 125       Dilation/Effacement/Station  Dilation: Closed  Effacement (%): 70  Station: -1      Urine Glucose Read-only: Negative  Urine Protein Read-only: Negative           Assessment and Plan    Problem List Items Addressed This Visit       Excessive fetal growth affecting management of pregnancy in third trimester    Overview     Ultrasound 2024 at 36 weeks.  EFW at " 75%; AC 96%.  EVELYN 18.9.  Limit carbs and sugars.         Nausea/vomiting in pregnancy    Overview     Rx for Phenergan and Diclegis sent to pharmacy.    2/26/2024 nausea and vomiting improved.         Pregnancy    Overview     28-year-old G1, P0.    : Ismael  Dates consistent with 7-week ultrasound.  Cell free DNA screen was low risk and consistent with male.  aFP screen negative  TSH in low normal range.  Can repeat at 28 weeks.          Other Visit Diagnoses       Encounter for supervision of normal first pregnancy in third trimester    -  Primary    Relevant Orders    POC Urinalysis Dipstick (Completed)            Pregnancy at 37w3d; group B strep screen was negative.  Induction of labor scheduled with Dr. Flores at 39 weeks on 7/9 at 00:01.  Patient inquiring about moving induction to Friday 7 /5 due to 's work schedule.  Fetal status reassuring.   Reviewed Pre-eclampsia signs/symptoms  Discussed IOL options with patient. Pt. desires IOL at 39 weeks.   Delivery options reviewed with patient  Signs of labor reviewed  Kick counts reviewed  Activity and Exercise discussed.  Return in about 1 week (around 7/1/2024) for Next scheduled follow up.    Ismael De La Cruz MD  06/24/2024

## 2024-06-26 ENCOUNTER — TELEPHONE (OUTPATIENT)
Dept: OBSTETRICS AND GYNECOLOGY | Facility: CLINIC | Age: 29
End: 2024-06-26
Payer: COMMERCIAL

## 2024-06-27 ENCOUNTER — HOSPITAL ENCOUNTER (OUTPATIENT)
Facility: HOSPITAL | Age: 29
Setting detail: OBSERVATION
Discharge: HOME | End: 2024-06-27
Attending: OBSTETRICS & GYNECOLOGY | Admitting: OBSTETRICS & GYNECOLOGY
Payer: COMMERCIAL

## 2024-06-27 VITALS — RESPIRATION RATE: 18 BRPM | SYSTOLIC BLOOD PRESSURE: 126 MMHG | DIASTOLIC BLOOD PRESSURE: 86 MMHG | TEMPERATURE: 97.7 F

## 2024-06-27 PROBLEM — Z34.93 THIRD TRIMESTER PREGNANCY: Status: ACTIVE | Noted: 2024-06-27

## 2024-06-27 LAB — POC AMNISURE: NEGATIVE

## 2024-06-27 PROCEDURE — 99221 1ST HOSP IP/OBS SF/LOW 40: CPT | Performed by: OBSTETRICS & GYNECOLOGY

## 2024-06-27 PROCEDURE — G0463 HOSPITAL OUTPT CLINIC VISIT: HCPCS

## 2024-06-27 PROCEDURE — 59025 FETAL NON-STRESS TEST: CPT | Performed by: OBSTETRICS & GYNECOLOGY

## 2024-06-27 PROCEDURE — 59025 FETAL NON-STRESS TEST: CPT

## 2024-06-27 PROCEDURE — 84112 EVAL AMNIOTIC FLUID PROTEIN: CPT | Performed by: OBSTETRICS & GYNECOLOGY

## 2024-06-27 NOTE — H&P
"[facility]  Obstetric History and Physical    Referring Provider: Villa Flores MD      Chief Complaint   Patient presents with    Rupture of Membranes       Subjective     Patient is a 28 y.o. female  currently at 37w6d, who presents with complaint of possible rupture of membranes.  Patient reports gush of clear fluid earlier today with continued leaking of fluid.  Patient denies increased uterine activity, vaginal bleeding, reports normal fetal activity.   course uncomplicated to date.        The following portions of the patients history were reviewed and updated as appropriate: current medications, allergies, past medical history, past surgical history, past family history, past social history, and problem list .       Prenatal Information:   Maternal Prenatal Labs  Blood Type No results found for: \"ABO\"   Rh Status No results found for: \"RH\"   Antibody Screen No results found for: \"ABSCRN\"   Gonnorhea No results found for: \"GCCX\"   Chlamydia No results found for: \"CLAMYDCU\"   RPR No results found for: \"RPR\"   Syphilis Antibody No results found for: \"SYPHILIS\"   Rubella No results found for: \"RUBELLAIGGIN\"   Hepatitis B Surface Antigen No results found for: \"HEPBSAG\"   HIV-1 Antibody No results found for: \"LABHIV1\"   Hepatitis C Antibody No results found for: \"HEPCAB\"   Rapid Urin Drug Screen No results found for: \"AMPMETHU\", \"BARBITSCNUR\", \"LABBENZSCN\", \"LABMETHSCN\", \"LABOPIASCN\", \"THCURSCR\", \"COCAINEUR\", \"AMPHETSCREEN\", \"PROPOXSCN\", \"BUPRENORSCNU\", \"METAMPSCNUR\", \"OXYCODONESCN\", \"TRICYCLICSCN\"   Group B Strep Culture No results found for: \"GBSANTIGEN\"           External Prenatal Results       Pregnancy Outside Results - Transcribed From Office Records - See Scanned Records For Details       Test Value Date Time    ABO  A  23 0956    Rh  Positive  23 0956    Antibody Screen  Negative  24 1547       Negative  23 0956    Varicella IgG       Rubella  9.66 index 23 " 0956    Hgb  11.1 g/dL 24 1547       13.6 g/dL 23 0956    Hct  33.4 % 24 1547       40.6 % 23 0956    HgB A1c        1h GTT  119 mg/dL 24 1547    3h GTT Fasting       3h GTT 1 hour       3h GTT 2 hour       3h GTT 3 hour        Gonorrhea (discrete)  Negative  23 0956    Chlamydia (discrete)  Negative  23 0956    RPR  Non Reactive  24 1547       Non Reactive  23 0956    Syphilis Antibody       HBsAg  Negative  23 0956    Herpes Simplex Virus PCR       Herpes Simplex VIrus Culture       HIV  Non Reactive  23 0956    Hep C RNA Quant PCR       Hep C Antibody  Non Reactive  23 0956    AFP  24.4 ng/mL 24 1441    NIPT       Cystic Fibroisis        Group B Strep  No Group B Streptococcus Isolated at 2 days  24 1831    GBS Susceptibility to Clindamycin       GBS Susceptibility to Erythromycin       Fetal Fibronectin       Genetic Testing, Maternal Blood                 Drug Screening       Test Value Date Time    Urine Drug Screen       Amphetamine Screen  Negative ng/mL 23 0956    Barbiturate Screen  Negative ng/mL 23 0956    Benzodiazepine Screen  Negative ng/mL 23 0956    Methadone Screen  Negative ng/mL 23 0956    Phencyclidine Screen  Negative ng/mL 23 0956    Opiates Screen       THC Screen       Cocaine Screen       Propoxyphene Screen  Negative ng/mL 23 0956    Buprenorphine Screen       Methamphetamine Screen       Oxycodone Screen       Tricyclic Antidepressants Screen                 Legend    ^: Historical                              Past OB History:       OB History    Para Term  AB Living   1 0 0 0 0 0   SAB IAB Ectopic Molar Multiple Live Births   0 0 0 0 0 0      # Outcome Date GA Lbr Joss/2nd Weight Sex Type Anes PTL Lv   1 Current                Past Medical History: History reviewed. No pertinent past medical history.   Past Surgical History Past Surgical History:    Procedure Laterality Date    WISDOM TOOTH EXTRACTION        Family History: Family History   Problem Relation Age of Onset    Heart disease Father     Thyroid disease Mother     Hypertension Paternal Grandmother     Thyroid disease Maternal Grandmother     Thyroid disease Maternal Grandfather     Esophageal cancer Maternal Grandfather       Social History:  reports that she has never smoked. She has never used smokeless tobacco.   reports that she does not currently use alcohol.   reports no history of drug use.                   General ROS Negative Findings:Headaches, Visual Changes, Epigastric pain, Anorexia, Nausia/Vomiting, and Vaginal Bleeding    ROS     All other systems have been reviewed and are neg  Objective       Vital Signs Range for the last 24 hours  Temperature: Temp:  [97.7 °F (36.5 °C)] 97.7 °F (36.5 °C)   Temp Source: Temp src: Oral   BP: BP: (126)/(86) 126/86   Pulse:     Respirations: Resp:  [18] 18   SPO2:     O2 Amount (l/min):     O2 Devices     Weight:       Physical Examination:   General:   alert, appears stated age, and cooperative   Skin:   normal   HEENT:     Lungs:      Heart:      Gastrointestinal: Abdomen soft, gravid uterus, guarding benign exam   Lower Extremities: No edema, no calf tenderness   : External genitalia: normal general appearance  Uterus: enlarged   pH less than 4 AmniSure negative   Musculoskeletal:     Neuro:           Presentation: vtx   Cervix: Exam by: Method: sterile exam per physician   Dilation:  Fingertip   Effacement:  70%   Station:  -2  EXTR negative no blood on glove.       Fetal Heart Rate Assessment   Method:     Beats/min:     Baseline:     Varibility:     Accels:     Decels:     Tracing Category:     NST-indications possible rupture membranes, interpretation reactive, moderate variability, accelerations present 15 x 15, no fetal deceleration noted, onset 1031, end time 1536, no regular contractions noted.  Uterine Assessment   Method:     Frequency  (min):     Ctx Count in 10 min:     Duration:     Intensity:     Intensity by IUPC:      Resting Tone:     Resting Tone by IUPC:     Proctor Units:       Laboratory Results:   Lab Results (last 24 hours)       ** No results found for the last 24 hours. **          Radiology Review:   Imaging Results (Last 24 Hours)       ** No results found for the last 24 hours. **          Other Studies:    Assessment & Plan       Third trimester pregnancy        Assessment:  1.  Intrauterine pregnancy at 37w6d weeks gestation with reactive fetal status.    2.  No evidence of labor or rupture membranes  3.  GBS negative  4.      Plan:  1.  Discharge to home, kick count, labor instruction given, follow-up OB provider next week for scheduled pulmonary as needed  2. Plan of care has been reviewed with patient.  3.  Risks, benefits of treatment plan have been discussed.  4.  All questions have been answered.  5      Norman Alvares DO  6/27/2024  15:54 EDT

## 2024-07-01 ENCOUNTER — ROUTINE PRENATAL (OUTPATIENT)
Dept: OBSTETRICS AND GYNECOLOGY | Facility: CLINIC | Age: 29
End: 2024-07-01
Payer: COMMERCIAL

## 2024-07-01 ENCOUNTER — PREP FOR SURGERY (OUTPATIENT)
Dept: OTHER | Facility: HOSPITAL | Age: 29
End: 2024-07-01
Payer: COMMERCIAL

## 2024-07-01 VITALS — DIASTOLIC BLOOD PRESSURE: 82 MMHG | SYSTOLIC BLOOD PRESSURE: 112 MMHG | WEIGHT: 142.6 LBS | BODY MASS INDEX: 26.08 KG/M2

## 2024-07-01 DIAGNOSIS — O36.63X0 EXCESSIVE FETAL GROWTH AFFECTING MANAGEMENT OF PREGNANCY IN THIRD TRIMESTER, SINGLE OR UNSPECIFIED FETUS: ICD-10-CM

## 2024-07-01 DIAGNOSIS — Z34.90 PREGNANCY, UNSPECIFIED GESTATIONAL AGE: Primary | ICD-10-CM

## 2024-07-01 DIAGNOSIS — Z34.03 ENCOUNTER FOR SUPERVISION OF NORMAL FIRST PREGNANCY IN THIRD TRIMESTER: Primary | ICD-10-CM

## 2024-07-01 LAB
GLUCOSE UR STRIP-MCNC: NEGATIVE MG/DL
PROT UR STRIP-MCNC: NEGATIVE MG/DL

## 2024-07-01 PROCEDURE — 0502F SUBSEQUENT PRENATAL CARE: CPT | Performed by: OBSTETRICS & GYNECOLOGY

## 2024-07-01 RX ORDER — ACETAMINOPHEN 325 MG/1
650 TABLET ORAL EVERY 4 HOURS PRN
Status: CANCELLED | OUTPATIENT
Start: 2024-07-01

## 2024-07-01 RX ORDER — PROMETHAZINE HYDROCHLORIDE 12.5 MG/1
12.5 TABLET ORAL EVERY 6 HOURS PRN
Status: CANCELLED | OUTPATIENT
Start: 2024-07-01

## 2024-07-01 RX ORDER — SODIUM CHLORIDE 0.9 % (FLUSH) 0.9 %
10 SYRINGE (ML) INJECTION EVERY 12 HOURS SCHEDULED
Status: CANCELLED | OUTPATIENT
Start: 2024-07-01

## 2024-07-01 RX ORDER — MORPHINE SULFATE 2 MG/ML
2 INJECTION, SOLUTION INTRAMUSCULAR; INTRAVENOUS
Status: CANCELLED | OUTPATIENT
Start: 2024-07-01 | End: 2024-07-11

## 2024-07-01 RX ORDER — OXYTOCIN/0.9 % SODIUM CHLORIDE 30/500 ML
250 PLASTIC BAG, INJECTION (ML) INTRAVENOUS CONTINUOUS
Status: CANCELLED | OUTPATIENT
Start: 2024-07-01 | End: 2024-07-01

## 2024-07-01 RX ORDER — PROMETHAZINE HYDROCHLORIDE 12.5 MG/1
12.5 SUPPOSITORY RECTAL EVERY 6 HOURS PRN
Status: CANCELLED | OUTPATIENT
Start: 2024-07-01

## 2024-07-01 RX ORDER — MAGNESIUM CARB/ALUMINUM HYDROX 105-160MG
30 TABLET,CHEWABLE ORAL ONCE
Status: CANCELLED | OUTPATIENT
Start: 2024-07-01 | End: 2024-07-01

## 2024-07-01 RX ORDER — SODIUM CHLORIDE, SODIUM LACTATE, POTASSIUM CHLORIDE, CALCIUM CHLORIDE 600; 310; 30; 20 MG/100ML; MG/100ML; MG/100ML; MG/100ML
125 INJECTION, SOLUTION INTRAVENOUS CONTINUOUS
Status: CANCELLED | OUTPATIENT
Start: 2024-07-01

## 2024-07-01 RX ORDER — METHYLERGONOVINE MALEATE 0.2 MG/ML
200 INJECTION INTRAVENOUS ONCE AS NEEDED
Status: CANCELLED | OUTPATIENT
Start: 2024-07-01

## 2024-07-01 RX ORDER — SODIUM CHLORIDE 0.9 % (FLUSH) 0.9 %
10 SYRINGE (ML) INJECTION AS NEEDED
Status: CANCELLED | OUTPATIENT
Start: 2024-07-01

## 2024-07-01 RX ORDER — OXYTOCIN/0.9 % SODIUM CHLORIDE 30/500 ML
999 PLASTIC BAG, INJECTION (ML) INTRAVENOUS ONCE
Status: CANCELLED | OUTPATIENT
Start: 2024-07-01 | End: 2024-07-01

## 2024-07-01 RX ORDER — MISOPROSTOL 200 UG/1
800 TABLET ORAL AS NEEDED
Status: CANCELLED | OUTPATIENT
Start: 2024-07-01

## 2024-07-01 RX ORDER — LIDOCAINE HYDROCHLORIDE 10 MG/ML
0.5 INJECTION, SOLUTION EPIDURAL; INFILTRATION; INTRACAUDAL; PERINEURAL ONCE AS NEEDED
Status: CANCELLED | OUTPATIENT
Start: 2024-07-01

## 2024-07-01 RX ORDER — CARBOPROST TROMETHAMINE 250 UG/ML
250 INJECTION, SOLUTION INTRAMUSCULAR AS NEEDED
Status: CANCELLED | OUTPATIENT
Start: 2024-07-01

## 2024-07-01 RX ORDER — OXYTOCIN/0.9 % SODIUM CHLORIDE 30/500 ML
2-20 PLASTIC BAG, INJECTION (ML) INTRAVENOUS
Status: CANCELLED | OUTPATIENT
Start: 2024-07-05

## 2024-07-01 RX ORDER — OXYCODONE AND ACETAMINOPHEN 7.5; 325 MG/1; MG/1
2 TABLET ORAL EVERY 4 HOURS PRN
Status: CANCELLED | OUTPATIENT
Start: 2024-07-01 | End: 2024-07-11

## 2024-07-01 RX ORDER — SODIUM CHLORIDE 9 MG/ML
40 INJECTION, SOLUTION INTRAVENOUS AS NEEDED
Status: CANCELLED | OUTPATIENT
Start: 2024-07-01

## 2024-07-01 NOTE — PROGRESS NOTES
OB FOLLOW UP  CC- Here for care of pregnancy        Jennifer Garcia is a 28 y.o.  38w3d patient being seen today for her obstetrical follow up visit. Patient reports irregular contractions, increase in vaginal discharge & hemorrhoids that are uncomfortable. Pt went to L&D on  for possible rupture of membranes. No evidence of ROM, per Dr. Alvares.     Her prenatal care is complicated by (and status) :   Patient Active Problem List   Diagnosis    Screening for cervical cancer    Infertility management    Pregnancy    Nausea/vomiting in pregnancy    Excessive fetal growth affecting management of pregnancy in third trimester    Third trimester pregnancy       GBS Status: negative  Group B Strep Culture   Date Value Ref Range Status   2024 No Group B Streptococcus Isolated at 2 days  Final         No Known Allergies     Her Delivery Plan is: Desires IOL at 39wks. Scheduled  on 24 @ 12am with Dr. Flores. IOL paperwork reviewed with pt.  US today: no  Non Stress Test: No.    ROS -   Patient Denies: Loss of Fluid, Vaginal Spotting, Vision Changes, Headaches, Nausea , Vomiting , Epigastric pain, and skin itching  Fetal Movement : normal  All other systems reviewed and are negative.       The additional following portions of the patient's history were reviewed and updated as appropriate: allergies, current medications, past family history, past medical history, past social history, past surgical history, and problem list.    I have reviewed and agree with the HPI, ROS, and historical information as entered above.   Ismael De La Cruz MD        EXAM:     Prenatal Vitals  BP: 112/82  Weight: 64.7 kg (142 lb 9.6 oz)   Fetal Heart Rate: 120       Dilation/Effacement/Station  Dilation: Closed  Effacement (%): 70  Station: -2      Urine Glucose Read-only: Negative  Urine Protein Read-only: Negative           Assessment and Plan    Problem List Items Addressed This Visit       Excessive fetal  growth affecting management of pregnancy in third trimester    Overview     Ultrasound 6/17/2024 at 36 weeks.  EFW at 75%; AC 96%.  EVELYN 18.9.  Limit carbs and sugars.         Pregnancy - Primary    Overview     28-year-old G1, P0.    : Ismael  Dates consistent with 7-week ultrasound.  Cell free DNA screen was low risk and consistent with male.  aFP screen negative  TSH in low normal range.  Can repeat at 28 weeks.         Relevant Orders    POC Urinalysis Dipstick (Completed)       Pregnancy at 38w3d  Fetal status reassuring.   LGA.  Desires induction of labor at 39 weeks.Dr Desai as scheduled induction midnight of 7/8/24; 7/9 at 00:01; will need Cisneros balloon for cervical ripening.  Reviewed Pre-eclampsia signs/symptoms  Reviewed upcoming IOL with patient. Instructions given.  Delivery options reviewed with patient  Signs of labor reviewed  Kick counts reviewed  Activity and Exercise discussed.  No follow-ups on file.    Ismael De La Cruz MD  07/01/2024

## 2024-07-05 ENCOUNTER — HOSPITAL ENCOUNTER (INPATIENT)
Facility: HOSPITAL | Age: 29
LOS: 2 days | Discharge: HOME | End: 2024-07-07
Attending: OBSTETRICS & GYNECOLOGY | Admitting: OBSTETRICS & GYNECOLOGY
Payer: COMMERCIAL

## 2024-07-05 ENCOUNTER — ANESTHESIA EVENT (OUTPATIENT)
Dept: LABOR AND DELIVERY | Facility: HOSPITAL | Age: 29
End: 2024-07-05
Payer: COMMERCIAL

## 2024-07-05 ENCOUNTER — ANESTHESIA (OUTPATIENT)
Dept: LABOR AND DELIVERY | Facility: HOSPITAL | Age: 29
End: 2024-07-05
Payer: COMMERCIAL

## 2024-07-05 ENCOUNTER — HOSPITAL ENCOUNTER (OUTPATIENT)
Dept: LABOR AND DELIVERY | Facility: HOSPITAL | Age: 29
Discharge: HOME | End: 2024-07-05
Payer: COMMERCIAL

## 2024-07-05 DIAGNOSIS — Z34.03 ENCOUNTER FOR SUPERVISION OF NORMAL FIRST PREGNANCY IN THIRD TRIMESTER: ICD-10-CM

## 2024-07-05 PROBLEM — Z34.90 CURRENTLY PREGNANT: Status: ACTIVE | Noted: 2024-07-05

## 2024-07-05 PROBLEM — O36.63X0 EXCESSIVE FETAL GROWTH AFFECTING MANAGEMENT OF PREGNANCY IN THIRD TRIMESTER: Status: ACTIVE | Noted: 2024-07-05

## 2024-07-05 LAB
ABO GROUP BLD: NORMAL
BLD GP AB SCN SERPL QL: NEGATIVE
DEPRECATED RDW RBC AUTO: 39.3 FL (ref 37–54)
ERYTHROCYTE [DISTWIDTH] IN BLOOD BY AUTOMATED COUNT: 12.9 % (ref 12.3–15.4)
HCT VFR BLD AUTO: 31.4 % (ref 34–46.6)
HGB BLD-MCNC: 10.3 G/DL (ref 12–15.9)
MCH RBC QN AUTO: 27.8 PG (ref 26.6–33)
MCHC RBC AUTO-ENTMCNC: 32.8 G/DL (ref 31.5–35.7)
MCV RBC AUTO: 84.9 FL (ref 79–97)
PLATELET # BLD AUTO: 246 10*3/MM3 (ref 140–450)
PMV BLD AUTO: 11.2 FL (ref 6–12)
RBC # BLD AUTO: 3.7 10*6/MM3 (ref 3.77–5.28)
RH BLD: POSITIVE
T&S EXPIRATION DATE: NORMAL
TREPONEMA PALLIDUM IGG+IGM AB [PRESENCE] IN SERUM OR PLASMA BY IMMUNOASSAY: NORMAL
WBC NRBC COR # BLD AUTO: 10.07 10*3/MM3 (ref 3.4–10.8)

## 2024-07-05 PROCEDURE — 25010000002 METHYLERGONOVINE MALEATE PER 0.2 MG

## 2024-07-05 PROCEDURE — 25010000002 ONDANSETRON PER 1 MG: Performed by: NURSE ANESTHETIST, CERTIFIED REGISTERED

## 2024-07-05 PROCEDURE — C1755 CATHETER, INTRASPINAL: HCPCS | Performed by: ANESTHESIOLOGY

## 2024-07-05 PROCEDURE — 86850 RBC ANTIBODY SCREEN: CPT | Performed by: OBSTETRICS & GYNECOLOGY

## 2024-07-05 PROCEDURE — 25010000002 MORPHINE PER 10 MG: Performed by: ANESTHESIOLOGY

## 2024-07-05 PROCEDURE — 59025 FETAL NON-STRESS TEST: CPT

## 2024-07-05 PROCEDURE — 25810000003 LACTATED RINGERS PER 1000 ML: Performed by: OBSTETRICS & GYNECOLOGY

## 2024-07-05 PROCEDURE — 25010000002 CEFAZOLIN PER 500 MG: Performed by: OBSTETRICS & GYNECOLOGY

## 2024-07-05 PROCEDURE — 25010000002 ROPIVACAINE PER 1 MG: Performed by: NURSE ANESTHETIST, CERTIFIED REGISTERED

## 2024-07-05 PROCEDURE — 59400 OBSTETRICAL CARE: CPT | Performed by: OBSTETRICS & GYNECOLOGY

## 2024-07-05 PROCEDURE — 86901 BLOOD TYPING SEROLOGIC RH(D): CPT | Performed by: OBSTETRICS & GYNECOLOGY

## 2024-07-05 PROCEDURE — 0DQP0ZZ REPAIR RECTUM, OPEN APPROACH: ICD-10-PCS | Performed by: OBSTETRICS & GYNECOLOGY

## 2024-07-05 PROCEDURE — 25010000002 FENTANYL CITRATE (PF) 50 MCG/ML SOLUTION: Performed by: OBSTETRICS & GYNECOLOGY

## 2024-07-05 PROCEDURE — 25010000002 FENTANYL CITRATE (PF) 50 MCG/ML SOLUTION: Performed by: NURSE ANESTHETIST, CERTIFIED REGISTERED

## 2024-07-05 PROCEDURE — 86900 BLOOD TYPING SEROLOGIC ABO: CPT | Performed by: OBSTETRICS & GYNECOLOGY

## 2024-07-05 PROCEDURE — 86780 TREPONEMA PALLIDUM: CPT | Performed by: OBSTETRICS & GYNECOLOGY

## 2024-07-05 PROCEDURE — 85027 COMPLETE CBC AUTOMATED: CPT | Performed by: OBSTETRICS & GYNECOLOGY

## 2024-07-05 RX ORDER — LIDOCAINE HYDROCHLORIDE 10 MG/ML
INJECTION, SOLUTION INFILTRATION; PERINEURAL
Status: DISCONTINUED
Start: 2024-07-05 | End: 2024-07-07 | Stop reason: HOSPADM

## 2024-07-05 RX ORDER — LIDOCAINE HCL/EPINEPHRINE/PF 2%-1:200K
VIAL (ML) INJECTION AS NEEDED
Status: DISCONTINUED | OUTPATIENT
Start: 2024-07-05 | End: 2024-07-05 | Stop reason: SURG

## 2024-07-05 RX ORDER — OXYTOCIN/0.9 % SODIUM CHLORIDE 30/500 ML
999 PLASTIC BAG, INJECTION (ML) INTRAVENOUS ONCE
Qty: 500 ML | Refills: 0 | Status: COMPLETED | OUTPATIENT
Start: 2024-07-05 | End: 2024-07-05

## 2024-07-05 RX ORDER — METHYLERGONOVINE MALEATE 0.2 MG/ML
INJECTION INTRAVENOUS
Status: DISCONTINUED
Start: 2024-07-05 | End: 2024-07-07 | Stop reason: HOSPADM

## 2024-07-05 RX ORDER — LIDOCAINE HYDROCHLORIDE AND EPINEPHRINE 15; 5 MG/ML; UG/ML
INJECTION, SOLUTION EPIDURAL AS NEEDED
Status: DISCONTINUED | OUTPATIENT
Start: 2024-07-05 | End: 2024-07-05 | Stop reason: SURG

## 2024-07-05 RX ORDER — FENTANYL CITRATE 50 UG/ML
50 INJECTION, SOLUTION INTRAMUSCULAR; INTRAVENOUS
Status: DISCONTINUED | OUTPATIENT
Start: 2024-07-05 | End: 2024-07-05 | Stop reason: HOSPADM

## 2024-07-05 RX ORDER — ACETAMINOPHEN 325 MG/1
650 TABLET ORAL EVERY 6 HOURS PRN
Status: DISCONTINUED | OUTPATIENT
Start: 2024-07-05 | End: 2024-07-07 | Stop reason: HOSPADM

## 2024-07-05 RX ORDER — SODIUM CHLORIDE 0.9 % (FLUSH) 0.9 %
10 SYRINGE (ML) INJECTION AS NEEDED
Status: DISCONTINUED | OUTPATIENT
Start: 2024-07-05 | End: 2024-07-05 | Stop reason: HOSPADM

## 2024-07-05 RX ORDER — MISOPROSTOL 200 UG/1
800 TABLET ORAL AS NEEDED
Status: DISCONTINUED | OUTPATIENT
Start: 2024-07-05 | End: 2024-07-05 | Stop reason: HOSPADM

## 2024-07-05 RX ORDER — MORPHINE SULFATE 0.5 MG/ML
INJECTION, SOLUTION EPIDURAL; INTRATHECAL; INTRAVENOUS AS NEEDED
Status: DISCONTINUED | OUTPATIENT
Start: 2024-07-05 | End: 2024-07-05 | Stop reason: SURG

## 2024-07-05 RX ORDER — HYDROCORTISONE 25 MG/G
1 CREAM TOPICAL AS NEEDED
Status: DISCONTINUED | OUTPATIENT
Start: 2024-07-05 | End: 2024-07-07 | Stop reason: HOSPADM

## 2024-07-05 RX ORDER — LIDOCAINE HYDROCHLORIDE 10 MG/ML
0.5 INJECTION, SOLUTION EPIDURAL; INFILTRATION; INTRACAUDAL; PERINEURAL ONCE AS NEEDED
Status: DISCONTINUED | OUTPATIENT
Start: 2024-07-05 | End: 2024-07-05 | Stop reason: HOSPADM

## 2024-07-05 RX ORDER — LIDOCAINE HYDROCHLORIDE 20 MG/ML
INJECTION, SOLUTION EPIDURAL; INFILTRATION; INTRACAUDAL; PERINEURAL AS NEEDED
Status: DISCONTINUED | OUTPATIENT
Start: 2024-07-05 | End: 2024-07-05 | Stop reason: SURG

## 2024-07-05 RX ORDER — EPHEDRINE SULFATE 5 MG/ML
INJECTION INTRAVENOUS
Status: DISCONTINUED
Start: 2024-07-05 | End: 2024-07-07 | Stop reason: HOSPADM

## 2024-07-05 RX ORDER — PROMETHAZINE HYDROCHLORIDE 12.5 MG/1
12.5 TABLET ORAL EVERY 6 HOURS PRN
Status: DISCONTINUED | OUTPATIENT
Start: 2024-07-05 | End: 2024-07-05 | Stop reason: HOSPADM

## 2024-07-05 RX ORDER — OXYTOCIN/0.9 % SODIUM CHLORIDE 30/500 ML
250 PLASTIC BAG, INJECTION (ML) INTRAVENOUS CONTINUOUS
Status: DISPENSED | OUTPATIENT
Start: 2024-07-05 | End: 2024-07-05

## 2024-07-05 RX ORDER — OXYTOCIN/0.9 % SODIUM CHLORIDE 30/500 ML
2-20 PLASTIC BAG, INJECTION (ML) INTRAVENOUS
Status: DISCONTINUED | OUTPATIENT
Start: 2024-07-05 | End: 2024-07-05 | Stop reason: HOSPADM

## 2024-07-05 RX ORDER — MORPHINE SULFATE 2 MG/ML
2 INJECTION, SOLUTION INTRAMUSCULAR; INTRAVENOUS
Status: DISCONTINUED | OUTPATIENT
Start: 2024-07-05 | End: 2024-07-05 | Stop reason: HOSPADM

## 2024-07-05 RX ORDER — DIPHENHYDRAMINE HYDROCHLORIDE 50 MG/ML
12.5 INJECTION INTRAMUSCULAR; INTRAVENOUS EVERY 8 HOURS PRN
Status: DISCONTINUED | OUTPATIENT
Start: 2024-07-05 | End: 2024-07-05 | Stop reason: HOSPADM

## 2024-07-05 RX ORDER — FENTANYL CITRATE 50 UG/ML
INJECTION, SOLUTION INTRAMUSCULAR; INTRAVENOUS AS NEEDED
Status: DISCONTINUED | OUTPATIENT
Start: 2024-07-05 | End: 2024-07-05 | Stop reason: SURG

## 2024-07-05 RX ORDER — OXYTOCIN/0.9 % SODIUM CHLORIDE 30/500 ML
2-20 PLASTIC BAG, INJECTION (ML) INTRAVENOUS
Status: DISCONTINUED | OUTPATIENT
Start: 2024-07-05 | End: 2024-07-05

## 2024-07-05 RX ORDER — PROMETHAZINE HYDROCHLORIDE 12.5 MG/1
12.5 SUPPOSITORY RECTAL EVERY 6 HOURS PRN
Status: DISCONTINUED | OUTPATIENT
Start: 2024-07-05 | End: 2024-07-05 | Stop reason: HOSPADM

## 2024-07-05 RX ORDER — ACETAMINOPHEN 325 MG/1
650 TABLET ORAL EVERY 4 HOURS PRN
Status: DISCONTINUED | OUTPATIENT
Start: 2024-07-05 | End: 2024-07-05 | Stop reason: HOSPADM

## 2024-07-05 RX ORDER — ONDANSETRON 2 MG/ML
4 INJECTION INTRAMUSCULAR; INTRAVENOUS ONCE AS NEEDED
Status: COMPLETED | OUTPATIENT
Start: 2024-07-05 | End: 2024-07-05

## 2024-07-05 RX ORDER — PROMETHAZINE HYDROCHLORIDE 25 MG/1
25 TABLET ORAL EVERY 6 HOURS PRN
Status: DISCONTINUED | OUTPATIENT
Start: 2024-07-05 | End: 2024-07-07 | Stop reason: HOSPADM

## 2024-07-05 RX ORDER — METHYLERGONOVINE MALEATE 0.2 MG/ML
INJECTION INTRAVENOUS
Status: COMPLETED
Start: 2024-07-05 | End: 2024-07-05

## 2024-07-05 RX ORDER — HYDROCODONE BITARTRATE AND ACETAMINOPHEN 10; 325 MG/1; MG/1
1 TABLET ORAL EVERY 4 HOURS PRN
Status: DISCONTINUED | OUTPATIENT
Start: 2024-07-05 | End: 2024-07-07 | Stop reason: HOSPADM

## 2024-07-05 RX ORDER — METHYLERGONOVINE MALEATE 0.2 MG/ML
200 INJECTION INTRAVENOUS ONCE AS NEEDED
Status: DISCONTINUED | OUTPATIENT
Start: 2024-07-05 | End: 2024-07-05 | Stop reason: HOSPADM

## 2024-07-05 RX ORDER — SODIUM CHLORIDE 9 MG/ML
40 INJECTION, SOLUTION INTRAVENOUS AS NEEDED
Status: DISCONTINUED | OUTPATIENT
Start: 2024-07-05 | End: 2024-07-05 | Stop reason: HOSPADM

## 2024-07-05 RX ORDER — OXYCODONE AND ACETAMINOPHEN 7.5; 325 MG/1; MG/1
2 TABLET ORAL EVERY 4 HOURS PRN
Status: DISCONTINUED | OUTPATIENT
Start: 2024-07-05 | End: 2024-07-05 | Stop reason: HOSPADM

## 2024-07-05 RX ORDER — LIDOCAINE HYDROCHLORIDE 20 MG/ML
INJECTION, SOLUTION EPIDURAL; INFILTRATION; INTRACAUDAL; PERINEURAL AS NEEDED
Status: DISCONTINUED | OUTPATIENT
Start: 2024-07-05 | End: 2024-07-05

## 2024-07-05 RX ORDER — FENTANYL CITRATE 50 UG/ML
25 INJECTION, SOLUTION INTRAMUSCULAR; INTRAVENOUS
Status: DISCONTINUED | OUTPATIENT
Start: 2024-07-05 | End: 2024-07-07 | Stop reason: HOSPADM

## 2024-07-05 RX ORDER — CITRIC ACID/SODIUM CITRATE 334-500MG
30 SOLUTION, ORAL ORAL ONCE
Status: DISCONTINUED | OUTPATIENT
Start: 2024-07-05 | End: 2024-07-05 | Stop reason: HOSPADM

## 2024-07-05 RX ORDER — ROPIVACAINE HYDROCHLORIDE 2 MG/ML
14 INJECTION, SOLUTION EPIDURAL; INFILTRATION; PERINEURAL CONTINUOUS
Status: DISCONTINUED | OUTPATIENT
Start: 2024-07-05 | End: 2024-07-05

## 2024-07-05 RX ORDER — BISACODYL 10 MG
10 SUPPOSITORY, RECTAL RECTAL DAILY PRN
Status: DISCONTINUED | OUTPATIENT
Start: 2024-07-06 | End: 2024-07-07 | Stop reason: HOSPADM

## 2024-07-05 RX ORDER — HYDROCODONE BITARTRATE AND ACETAMINOPHEN 5; 325 MG/1; MG/1
1 TABLET ORAL EVERY 4 HOURS PRN
Status: DISCONTINUED | OUTPATIENT
Start: 2024-07-05 | End: 2024-07-07 | Stop reason: HOSPADM

## 2024-07-05 RX ORDER — OXYTOCIN/0.9 % SODIUM CHLORIDE 30/500 ML
125 PLASTIC BAG, INJECTION (ML) INTRAVENOUS ONCE AS NEEDED
Status: DISCONTINUED | OUTPATIENT
Start: 2024-07-05 | End: 2024-07-07 | Stop reason: HOSPADM

## 2024-07-05 RX ORDER — ACETAMINOPHEN 325 MG/1
650 TABLET ORAL EVERY 4 HOURS PRN
Status: DISCONTINUED | OUTPATIENT
Start: 2024-07-05 | End: 2024-07-05 | Stop reason: SDUPTHER

## 2024-07-05 RX ORDER — MORPHINE SULFATE 2 MG/ML
2 INJECTION, SOLUTION INTRAMUSCULAR; INTRAVENOUS
Status: DISCONTINUED | OUTPATIENT
Start: 2024-07-05 | End: 2024-07-05 | Stop reason: SDUPTHER

## 2024-07-05 RX ORDER — DOCUSATE SODIUM 100 MG/1
100 CAPSULE, LIQUID FILLED ORAL 2 TIMES DAILY
Status: DISCONTINUED | OUTPATIENT
Start: 2024-07-05 | End: 2024-07-07 | Stop reason: HOSPADM

## 2024-07-05 RX ORDER — CARBOPROST TROMETHAMINE 250 UG/ML
250 INJECTION, SOLUTION INTRAMUSCULAR AS NEEDED
Status: DISCONTINUED | OUTPATIENT
Start: 2024-07-05 | End: 2024-07-05 | Stop reason: HOSPADM

## 2024-07-05 RX ORDER — MAGNESIUM CARB/ALUMINUM HYDROX 105-160MG
30 TABLET,CHEWABLE ORAL ONCE
Status: DISCONTINUED | OUTPATIENT
Start: 2024-07-05 | End: 2024-07-05 | Stop reason: HOSPADM

## 2024-07-05 RX ORDER — FAMOTIDINE 10 MG/ML
20 INJECTION, SOLUTION INTRAVENOUS ONCE AS NEEDED
Status: DISCONTINUED | OUTPATIENT
Start: 2024-07-05 | End: 2024-07-05 | Stop reason: HOSPADM

## 2024-07-05 RX ORDER — SODIUM CHLORIDE 0.9 % (FLUSH) 0.9 %
10 SYRINGE (ML) INJECTION EVERY 12 HOURS SCHEDULED
Status: DISCONTINUED | OUTPATIENT
Start: 2024-07-05 | End: 2024-07-05 | Stop reason: HOSPADM

## 2024-07-05 RX ORDER — SODIUM CHLORIDE, SODIUM LACTATE, POTASSIUM CHLORIDE, CALCIUM CHLORIDE 600; 310; 30; 20 MG/100ML; MG/100ML; MG/100ML; MG/100ML
125 INJECTION, SOLUTION INTRAVENOUS CONTINUOUS
Status: DISCONTINUED | OUTPATIENT
Start: 2024-07-05 | End: 2024-07-05

## 2024-07-05 RX ORDER — EPHEDRINE SULFATE 5 MG/ML
10 INJECTION INTRAVENOUS
Status: DISCONTINUED | OUTPATIENT
Start: 2024-07-05 | End: 2024-07-05 | Stop reason: HOSPADM

## 2024-07-05 RX ORDER — IBUPROFEN 600 MG/1
600 TABLET ORAL EVERY 6 HOURS PRN
Status: DISCONTINUED | OUTPATIENT
Start: 2024-07-05 | End: 2024-07-07 | Stop reason: HOSPADM

## 2024-07-05 RX ADMIN — METHYLERGONOVINE MALEATE 200 MCG: 0.2 INJECTION INTRAVENOUS at 19:29

## 2024-07-05 RX ADMIN — SODIUM CHLORIDE, POTASSIUM CHLORIDE, SODIUM LACTATE AND CALCIUM CHLORIDE 125 ML/HR: 600; 310; 30; 20 INJECTION, SOLUTION INTRAVENOUS at 01:02

## 2024-07-05 RX ADMIN — LIDOCAINE HYDROCHLORIDE 5 ML: 20 INJECTION, SOLUTION EPIDURAL; INFILTRATION; INTRACAUDAL; PERINEURAL at 19:39

## 2024-07-05 RX ADMIN — SODIUM CHLORIDE, POTASSIUM CHLORIDE, SODIUM LACTATE AND CALCIUM CHLORIDE 125 ML/HR: 600; 310; 30; 20 INJECTION, SOLUTION INTRAVENOUS at 07:12

## 2024-07-05 RX ADMIN — FENTANYL CITRATE 50 MCG: 50 INJECTION, SOLUTION INTRAMUSCULAR; INTRAVENOUS at 01:44

## 2024-07-05 RX ADMIN — ROPIVACAINE HYDROCHLORIDE 10 ML: 5 INJECTION, SOLUTION EPIDURAL; INFILTRATION; PERINEURAL at 07:37

## 2024-07-05 RX ADMIN — LIDOCAINE HYDROCHLORIDE AND EPINEPHRINE 3 ML: 15; 5 INJECTION, SOLUTION EPIDURAL at 07:33

## 2024-07-05 RX ADMIN — Medication: at 23:32

## 2024-07-05 RX ADMIN — IBUPROFEN 600 MG: 600 TABLET, FILM COATED ORAL at 23:32

## 2024-07-05 RX ADMIN — SODIUM CHLORIDE 2000 MG: 900 INJECTION INTRAVENOUS at 23:32

## 2024-07-05 RX ADMIN — ROPIVACAINE HYDROCHLORIDE 14 ML/HR: 2 INJECTION, SOLUTION EPIDURAL; INFILTRATION at 07:39

## 2024-07-05 RX ADMIN — WITCH HAZEL 1 PAD: 500 SOLUTION RECTAL; TOPICAL at 23:32

## 2024-07-05 RX ADMIN — LIDOCAINE HYDROCHLORIDE 13 ML: 20 INJECTION, SOLUTION EPIDURAL; INFILTRATION; INTRACAUDAL; PERINEURAL at 18:55

## 2024-07-05 RX ADMIN — MORPHINE SULFATE 3 MG: 0.5 INJECTION, SOLUTION EPIDURAL; INTRATHECAL; INTRAVENOUS at 19:41

## 2024-07-05 RX ADMIN — LIDOCAINE HYDROCHLORIDE AND EPINEPHRINE 7 ML: 20; 5 INJECTION, SOLUTION EPIDURAL; INFILTRATION; INTRACAUDAL; PERINEURAL at 17:00

## 2024-07-05 RX ADMIN — ONDANSETRON 4 MG: 2 INJECTION INTRAMUSCULAR; INTRAVENOUS at 20:25

## 2024-07-05 RX ADMIN — Medication 999 ML/HR: at 19:50

## 2024-07-05 RX ADMIN — OXYCODONE HYDROCHLORIDE AND ACETAMINOPHEN 2 TABLET: 7.5; 325 TABLET ORAL at 22:02

## 2024-07-05 RX ADMIN — Medication 2 MILLI-UNITS/MIN: at 03:39

## 2024-07-05 RX ADMIN — FENTANYL CITRATE 100 MCG: 50 INJECTION, SOLUTION INTRAMUSCULAR; INTRAVENOUS at 07:35

## 2024-07-05 RX ADMIN — HYDROCORTISONE 2.5% 1 APPLICATION: 25 CREAM TOPICAL at 23:32

## 2024-07-05 RX ADMIN — SODIUM CHLORIDE, POTASSIUM CHLORIDE, SODIUM LACTATE AND CALCIUM CHLORIDE 125 ML/HR: 600; 310; 30; 20 INJECTION, SOLUTION INTRAVENOUS at 03:37

## 2024-07-05 NOTE — H&P
[facility]  Obstetric History and Physical    Chief Complaint   Patient presents with    Scheduled Induction       Subjective     Patient is a 28 y.o. female  currently at 39w0d, who presents with pregnancy at term for elective induction.  Risks were explained she and her  want to proceed..    Her prenatal care is benign.  Her previous obstetric/gynecological history is noted for is non-contributory.    The following portions of the patients history were reviewed and updated as appropriate: current medications .       Prenatal Information:  Prenatal Results       Initial Prenatal Labs       Test Value Reference Range Date Time    Hemoglobin  13.6 g/dL 11.1 - 15.9 23 0956    Hematocrit  40.6 % 34.0 - 46.6 23 0956    Platelets  333 x10E3/uL 150 - 450 23 0956    Rubella IgG  9.66 index Immune >0.99 23 09    Hepatitis B SAg  Negative  Negative 23 0956    Hepatitis C Ab  Non Reactive  Non Reactive 23 0956    RPR  Non Reactive  Non Reactive 24 1547       Non Reactive  Non Reactive 23 0956    T. Pallidum Ab         ABO  A   24 0046    Rh  Positive   24 0046    Antibody Screen  Negative  Negative 23 0956    HIV  Non Reactive  Non Reactive 23 0956    Urine Culture  Final report   23 09    Gonorrhea  Negative  Negative 23 09    Chlamydia  Negative  Negative 23 0956    TSH  1.940 uIU/mL 0.270 - 4.200 24 1547       0.654 uIU/mL 0.450 - 4.500 24 1441    HgB A1c         Varicella IgG        Hemoglobinopathy Fractionation        Hemoglobinopathy (genetic testing)        Cystic fibrosis                   Fetal testing        Test Value Reference Range Date Time    NIPT        MSAFP  *Screen Negative*   24 1441    AFP-4                  2nd and 3rd Trimester       Test Value Reference Range Date Time    Hemoglobin (repeated)  10.3 g/dL 12.0 - 15.9 24 0046       11.1 g/dL 12.0 - 15.9 24 1547     Hematocrit (repeated)  31.4 % 34.0 - 46.6 07/05/24 0046       33.4 % 34.0 - 46.6 04/22/24 1547    Platelets   246 10*3/mm3 140 - 450 07/05/24 0046       282 10*3/mm3 140 - 450 04/22/24 1547       333 x10E3/uL 150 - 450 11/29/23 0956    1 hour GTT   119 mg/dL 65 - 139 04/22/24 1547    Antibody Screen (repeated)  Negative   07/05/24 0046       Negative  Negative 04/22/24 1547    3rd TM syphilis scrn (repeated)  RPR   Non Reactive  Non Reactive 04/22/24 1547    3rd TM syphilis scrn (repeated) FTA        GTT Fasting        GTT 1 Hr        GTT 2 Hr        GTT 3 Hr        Group B Strep  No Group B Streptococcus Isolated at 2 days   06/17/24 1831              Other testing        Test Value Reference Range Date Time    Parvo IgG         CMV IgG                   Drug Screening       Test Value Reference Range Date Time    Amphetamine Screen  Negative ng/mL Thtxuz=0956 11/29/23 0956    Barbiturate Screen  Negative ng/mL Yiuzwf=593 11/29/23 0956    Benzodiazepine Screen  Negative ng/mL Mqtvlk=524 11/29/23 0956    Methadone Screen  Negative ng/mL Bvoheu=106 11/29/23 0956    Phencyclidine Screen  Negative ng/mL Cutoff=25 11/29/23 0956    Opiates Screen  Negative ng/mL Ycdyhm=619 11/29/23 0956    THC Screen  Negative ng/mL Cutoff=20 11/29/23 0956    Cocaine Screen  Negative ng/mL Arikrj=692 11/29/23 0956    Propoxyphene Screen  Negative ng/mL Kulhxj=611 11/29/23 0956    Buprenorphine Screen        Methamphetamine Screen        Oxycodone Screen        Tricyclic Antidepressants Screen                  Legend    ^: Historical                          External Prenatal Results       Pregnancy Outside Results - Transcribed From Office Records - See Scanned Records For Details       Test Value Date Time    ABO  A  07/05/24 0046    Rh  Positive  07/05/24 0046    Antibody Screen  Negative  07/05/24 0046       Negative  04/22/24 1547       Negative  11/29/23 0956    Varicella IgG       Rubella  9.66 index 11/29/23 0956    Hgb  10.3 g/dL  24 0046       11.1 g/dL 24 1547       13.6 g/dL 23 0956    Hct  31.4 % 24 0046       33.4 % 24 1547       40.6 % 23 0956    HgB A1c        1h GTT  119 mg/dL 24 1547    3h GTT Fasting       3h GTT 1 hour       3h GTT 2 hour       3h GTT 3 hour        Gonorrhea (discrete)  Negative  23 0956    Chlamydia (discrete)  Negative  23 0956    RPR  Non Reactive  24 1547       Non Reactive  23 0956    Syphilis Antibody       HBsAg  Negative  23 09    Herpes Simplex Virus PCR       Herpes Simplex VIrus Culture       HIV  Non Reactive  23 0956    Hep C RNA Quant PCR       Hep C Antibody  Non Reactive  23 0956    AFP  24.4 ng/mL 24 1441    NIPT       Cystic Fibroisis        Group B Strep  No Group B Streptococcus Isolated at 2 days  24 1831    GBS Susceptibility to Clindamycin       GBS Susceptibility to Erythromycin       Fetal Fibronectin       Genetic Testing, Maternal Blood                 Drug Screening       Test Value Date Time    Urine Drug Screen       Amphetamine Screen  Negative ng/mL 23 0956    Barbiturate Screen  Negative ng/mL 23 0956    Benzodiazepine Screen  Negative ng/mL 23 0956    Methadone Screen  Negative ng/mL 23 0956    Phencyclidine Screen  Negative ng/mL 23 0956    Opiates Screen       THC Screen       Cocaine Screen       Propoxyphene Screen  Negative ng/mL 23 0956    Buprenorphine Screen       Methamphetamine Screen       Oxycodone Screen       Tricyclic Antidepressants Screen                 Legend    ^: Historical                             Past OB History:     OB History    Para Term  AB Living   1 0 0 0 0 0   SAB IAB Ectopic Molar Multiple Live Births   0 0 0 0 0 0      # Outcome Date GA Lbr Joss/2nd Weight Sex Type Anes PTL Lv   1 Current                Past Medical History: History reviewed. No pertinent past medical history.   Past Surgical History  "Past Surgical History:   Procedure Laterality Date    WISDOM TOOTH EXTRACTION        Family History: Family History   Problem Relation Age of Onset    Heart disease Father     Thyroid disease Mother     Hypertension Paternal Grandmother     Thyroid disease Maternal Grandmother     Thyroid disease Maternal Grandfather     Esophageal cancer Maternal Grandfather       Social History:  reports that she has never smoked. She has never been exposed to tobacco smoke. She has never used smokeless tobacco.   reports that she does not currently use alcohol.   reports no history of drug use.        General ROS: Pertinent items are noted in HPI    Objective       Vital Signs Range for the last 24 hours  Temperature: Temp:  [97.9 °F (36.6 °C)-98.2 °F (36.8 °C)] 98.2 °F (36.8 °C)   Temp Source: Temp src: Oral   BP: BP: (111-128)/(62-87) 121/77   Pulse: Heart Rate:  [72-78] 78   Respirations: Resp:  [15-16] 16   SPO2:     O2 Amount (l/min):     O2 Devices     Weight: Weight:  [64.4 kg (142 lb)] 64.4 kg (142 lb)     Physical Examination:     Presentation:    Cervix: Exam by:     Dilation:     Effacement:     Station:         Fetal Heart Rate Assessment   Method:     Beats/min:     Baseline:     Varibility:     Accels:     Decels:     Tracing Category:       Uterine Assessment   Method:     Frequency (min):     Ctx Count in 10 min:     Duration:     Intensity:     Intensity by IUPC:     Resting Tone:     Resting Tone by IUPC:     [place] Units:       Laboratory Results:   Radiology Review:   Other Studies:     Assessment & Plan       Currently pregnant    Encounter for supervision of normal first pregnancy in third trimester        Assessment:  1.  Intrauterine pregnancy at 39w0d weeks gestation with reactive fetal status.    Possible large for gestational age fetus.  2.  Desires elective induction  3.  Obstetrical history significant for is non-contributory.  4.  GBS status: No results found for: \"GBSANTIGEN\"    Plan:  1.  " Pitocin and Cisneros bulb induction  2. Plan of care has been reviewed with patient and family  3.  Risks, benefits of treatment plan have been discussed.  4.  All questions have been answered.  5.        Villa Flores MD  7/5/2024  07:07 EDT

## 2024-07-05 NOTE — PROGRESS NOTES
CTSP for AROM   G1 @ 39'0 for elective IOL     SVE: 7/80/-2   Attempted AROM though no palpable forebag. ? Already ruptured.   FSE placed without complication     EFM: , +accels, no decels, mod BTBV   Halliday: q 2     FWB reassuring   Cont pit   S/p AROM   FSE in place

## 2024-07-05 NOTE — PROGRESS NOTES
Patient been pushing now for more than an hour.  Vertex is at +2 station with pushing possibly +3 baby appears to be in the occiput posterior position.  Fetal tracing is normal occasional D cells with pushing.  Patient has been unable to move the vertex any closer for the last 2030 minutes and is tiring.  She has been pushing very effectively.  I tried to rotate the vertex to OA and was unable to do so.  Counseled the patient and her  about the use of forceps.  Told her that forceps if easily applied them with a moderate to minimal pull we can deliver the vertex that would be 1 option.  The other option is to just proceed with .  She and her  opted for me to do a trial of forceps.  They understand the possibility of damage to fetus and mother but also the possibility of successful delivery with no problem.  We are going to wait until the operating room becomes available give her a forceps dose from anesthesia and attempted trial of forceps.  There is any issues with the forceps were going to stop and do a .  Baby looks good at this time and I do not think there is any danger in waiting a few minutes.  Since the family okays waiting for a few minutes wants me to try a trial of forceps to avoid a  if possible.  We will only do this if it is safe once we try.

## 2024-07-05 NOTE — PROCEDURES
28 y.o.  OB History          1    Para   0    Term   0       0    AB   0    Living   0         SAB   0    IAB   0    Ectopic   0    Molar   0    Multiple   0    Live Births   0             Presents at 39 0/7 weeks as an induction of labour due to unfavourable cervix   Her primary OB requests a Cisneros Bulb placement to initiate the induction of labour.    Fetal Heart Rate Assessment   Method:     Beats/min:     Baseline:     Varibility:     Accels:     Decels:     Tracing Category:       TOCO:  Irregular   SVE: /-2    A Cisneros Bulb was placed without difficulties with 60 cc of sterile saline.  The patient tolerated the procedure well.

## 2024-07-05 NOTE — ANESTHESIA PROCEDURE NOTES
Labor Epidural      Patient reassessed immediately prior to procedure    Patient location during procedure: OB  Performed By  CRNA/CAA: Patricia Rodriguez CRNA  Preanesthetic Checklist  Completed: patient identified, IV checked, risks and benefits discussed, surgical consent, monitors and equipment checked, pre-op evaluation and timeout performed  Prep:  Pt Position:sitting  Sterile Tech:cap, gloves, mask and sterile barrier  Prep:DuraPrep  Monitoring:blood pressure monitoring  Epidural Block Procedure:  Approach:midline  Guidance:palpation technique  Location:L3-L4  Needle Type:Tuohy  Needle Gauge:17 G  Loss of Resistance Medium: saline  Loss of Resistance: 5cm  Cath Depth at skin:12 cm  Paresthesia: none  Aspiration:negative  Test Dose:negative  Number of Attempts: 1  Post Assessment:  Dressing:occlusive dressing applied and secured with tape  Pt Tolerance:patient tolerated the procedure well with no apparent complications  Complications:no

## 2024-07-06 LAB
BASOPHILS # BLD AUTO: 0.05 10*3/MM3 (ref 0–0.2)
BASOPHILS NFR BLD AUTO: 0.3 % (ref 0–1.5)
DEPRECATED RDW RBC AUTO: 42.3 FL (ref 37–54)
EOSINOPHIL # BLD AUTO: 0.07 10*3/MM3 (ref 0–0.4)
EOSINOPHIL NFR BLD AUTO: 0.5 % (ref 0.3–6.2)
ERYTHROCYTE [DISTWIDTH] IN BLOOD BY AUTOMATED COUNT: 13.1 % (ref 12.3–15.4)
HCT VFR BLD AUTO: 21.7 % (ref 34–46.6)
HGB BLD-MCNC: 7 G/DL (ref 12–15.9)
IMM GRANULOCYTES # BLD AUTO: 0.11 10*3/MM3 (ref 0–0.05)
IMM GRANULOCYTES NFR BLD AUTO: 0.7 % (ref 0–0.5)
LYMPHOCYTES # BLD AUTO: 2.38 10*3/MM3 (ref 0.7–3.1)
LYMPHOCYTES NFR BLD AUTO: 16 % (ref 19.6–45.3)
MCH RBC QN AUTO: 28.3 PG (ref 26.6–33)
MCHC RBC AUTO-ENTMCNC: 32.3 G/DL (ref 31.5–35.7)
MCV RBC AUTO: 87.9 FL (ref 79–97)
MONOCYTES # BLD AUTO: 0.76 10*3/MM3 (ref 0.1–0.9)
MONOCYTES NFR BLD AUTO: 5.1 % (ref 5–12)
NEUTROPHILS NFR BLD AUTO: 11.46 10*3/MM3 (ref 1.7–7)
NEUTROPHILS NFR BLD AUTO: 77.4 % (ref 42.7–76)
NRBC BLD AUTO-RTO: 0 /100 WBC (ref 0–0.2)
PLATELET # BLD AUTO: 207 10*3/MM3 (ref 140–450)
PMV BLD AUTO: 11.5 FL (ref 6–12)
RBC # BLD AUTO: 2.47 10*6/MM3 (ref 3.77–5.28)
WBC NRBC COR # BLD AUTO: 14.83 10*3/MM3 (ref 3.4–10.8)

## 2024-07-06 PROCEDURE — 0503F POSTPARTUM CARE VISIT: CPT

## 2024-07-06 PROCEDURE — 85025 COMPLETE CBC W/AUTO DIFF WBC: CPT | Performed by: OBSTETRICS & GYNECOLOGY

## 2024-07-06 PROCEDURE — 25010000002 CEFAZOLIN PER 500 MG: Performed by: OBSTETRICS & GYNECOLOGY

## 2024-07-06 RX ORDER — FERROUS SULFATE 325(65) MG
325 TABLET ORAL 2 TIMES DAILY WITH MEALS
Status: DISCONTINUED | OUTPATIENT
Start: 2024-07-06 | End: 2024-07-07 | Stop reason: HOSPADM

## 2024-07-06 RX ADMIN — FERROUS SULFATE TAB 325 MG (65 MG ELEMENTAL FE) 325 MG: 325 (65 FE) TAB at 09:56

## 2024-07-06 RX ADMIN — IBUPROFEN 600 MG: 600 TABLET, FILM COATED ORAL at 19:45

## 2024-07-06 RX ADMIN — HYDROCODONE BITARTRATE AND ACETAMINOPHEN 1 TABLET: 10; 325 TABLET ORAL at 19:45

## 2024-07-06 RX ADMIN — FERROUS SULFATE TAB 325 MG (65 MG ELEMENTAL FE) 325 MG: 325 (65 FE) TAB at 17:49

## 2024-07-06 RX ADMIN — HYDROCODONE BITARTRATE AND ACETAMINOPHEN 1 TABLET: 5; 325 TABLET ORAL at 01:48

## 2024-07-06 RX ADMIN — SODIUM CHLORIDE 2000 MG: 900 INJECTION INTRAVENOUS at 15:47

## 2024-07-06 RX ADMIN — HYDROCODONE BITARTRATE AND ACETAMINOPHEN 1 TABLET: 10; 325 TABLET ORAL at 07:19

## 2024-07-06 RX ADMIN — DOCUSATE SODIUM 100 MG: 100 CAPSULE, LIQUID FILLED ORAL at 19:44

## 2024-07-06 RX ADMIN — IBUPROFEN 600 MG: 600 TABLET, FILM COATED ORAL at 13:04

## 2024-07-06 RX ADMIN — SODIUM CHLORIDE 2000 MG: 900 INJECTION INTRAVENOUS at 07:16

## 2024-07-06 RX ADMIN — IBUPROFEN 600 MG: 600 TABLET, FILM COATED ORAL at 07:19

## 2024-07-06 RX ADMIN — HYDROCODONE BITARTRATE AND ACETAMINOPHEN 1 TABLET: 10; 325 TABLET ORAL at 15:38

## 2024-07-06 RX ADMIN — HYDROCODONE BITARTRATE AND ACETAMINOPHEN 1 TABLET: 10; 325 TABLET ORAL at 11:24

## 2024-07-06 RX ADMIN — DOCUSATE SODIUM 100 MG: 100 CAPSULE, LIQUID FILLED ORAL at 07:41

## 2024-07-06 NOTE — ANESTHESIA POSTPROCEDURE EVALUATION
Patient: Jennifer Garcia    Procedure Summary       Date: 07/05/24 Room / Location:     Anesthesia Start: 0722 Anesthesia Stop: 1941    Procedure: LABOR ANALGESIA Diagnosis:     Scheduled Providers:  Provider: Catarino Saavedra MD    Anesthesia Type: epidural ASA Status: 2            Anesthesia Type: epidural    Vitals  Vitals Value Taken Time   /65 07/06/24 0750   Temp 97.9 °F (36.6 °C) 07/06/24 0750   Pulse 82 07/06/24 0750   Resp 18 07/06/24 0750   SpO2 100 % 07/05/24 0734           Post Anesthesia Care and Evaluation    Patient location during evaluation: bedside  Patient participation: complete - patient participated  Level of consciousness: awake and awake and alert  Pain score: 0  Pain management: satisfactory to patient    Airway patency: patent  Anesthetic complications: No anesthetic complications  PONV Status: none  Cardiovascular status: acceptable, hemodynamically stable and stable  Respiratory status: acceptable  Hydration status: stable  Post Neuraxial Block status: Motor and sensory function returned to baseline and No signs or symptoms of PDPH

## 2024-07-06 NOTE — PROGRESS NOTES
SHERRY Mccall  Jennifer Garcia  : 1995  MRN: [id number]  CSN: [id number]    Hospital Day: 2    Postpartum Day #1  Subjective     CC: hospital follow-up    Her pain is well controlled.  Vaginal bleeding is less than a normal period.  She reports overall feeling well, despite anemia. Baby boy is breastfeeding, and she reports this is going well.      Objective     Min/max vitals past 24 hours:   Temp  Min: 97.9 °F (36.6 °C)  Max: 98.6 °F (37 °C)  BP  Min: 85/52  Max: 145/82  Pulse  Min: 63  Max: 125  Resp  Min: 18  Max: 18        Abdomen: soft, non-tender; bowel sounds normal; no masses   fundus firm and non-tender   Pelvic: deferred     Results from last 7 days   Lab Units 24  0634 24  0046   WBC 10*3/mm3 14.83* 10.07   HEMOGLOBIN g/dL 7.0* 10.3*   HEMATOCRIT % 21.7* 31.4*   PLATELETS 10*3/mm3 207 246     Lab Results   Component Value Date    RH Positive 2024    HEPBSAG Negative 2023     Results from last 7 days   Lab Units 24  0046   TREPONEMA PALLIDUM AB TOTAL  Non-Reactive                Assessment   Postpartum Day #1 S/P vaginal delivery  Postpartum anemia     Plan   Continue routine postpartum care, anticipate d/c home 24.  Start iron supplement today. Encouraged Jennifer to report symptoms consistent with anemia to JOSE Johnson  2024  10:12 EDT

## 2024-07-06 NOTE — L&D DELIVERY NOTE
[facility]   Vaginal Delivery Note    Patient Name: Jennifer Garcia  : 1995  MRN: [id number]    Date of Delivery: 2024     Diagnosis     Pre & Post-Delivery:  Intrauterine pregnancy at 39w0d  Labor status: Induced Onset of Labor     Currently pregnant    Encounter for supervision of normal first pregnancy in third trimester    Excessive fetal growth affecting management of pregnancy in third trimester             Problem List    Transfer to Postpartum     Review the Delivery Report for details.     Delivery     Delivery: Vaginal, Forceps     YOB: 2022    Time of Birth:  Gestational Age 7:18 PM   39w0d     Anesthesia: Epidural     Delivering clinician: Villa Weinstein    Forceps?   Yes  Forcep Delivery   Forceps type:     Application location: Mid    Number of pulls:  1   Total application time:  60 seconds   Applied by: ADRIENNE WEINSTEIN MD    Failed? No       Vacuum? No    Shoulder dystocia present: No        Delivery narrative: Patient pushed for 2 hours.  Vertex lysed at +2 station occiput posterior.  Fetal heart tracing showed no signs of distress.  Patient was counseled about the use of forceps and or .  After the lengthy discussion they decided to go ahead and attempt forcep trial.  They realize that a forceps were not an easy application and the baby did not move when forceps were used to pull that we would did not take them off and do .  They realize they could not guarantee complete safety for the baby and the mom with any of these methods including the .  Once this was determined that we were going to use forceps patient was given an extra dose from epidural for pain.  During one of the pushes the open symptoms forceps with forcep pads were applied with ease.  There was a tight pull that we did we pulled with interrupted effort.  Not 1 continuous pull.  The vertex was moving and eventually crowned and was lifted up as it was delivered.  Forceps were  "removed there was a double nuchal cord cut on the perineum and released.  Baby was delivered suction handed off immediately to the NICU doctors.  Cord blood obtained placenta expressed.  Uterus was explored. it was noted the patient had a right vaginal wall tear up to the vaginal  fornix near the cervix.  This tear was probably 10 cm long.  There was a second-degree tear.  There was a right labial tear.  This was probably 6 cm long. the second-degree tear was probably 5 cm long.  There was no fourth degree tear or buttonhole tear in the rectum.  Using Dr. Whatley's my assistant we were able to suture these laceration with a 2-0 chromic running lock stitch from the apex of the vaginal tear down to the vaginal opening there is several areas of attempted hematoma formation but these were stopped with deep stitches in the right vaginal wall.  Once all the lacerations were repaired we monitored the patient for 1020 minutes there is no hematoma formation internally or externally noted.  Patient's blood loss was about 650.  Uterus was firm.  Baby was being held by the patient's sister and was crying and did not have to go to NICU.  Patient was talkative and seemed in good spirits.  I am going to give her some antibiotics prophylactically.      Infant     Findings: male  infant     Infant observations: Weight: 3540 g (7 lb 12.9 oz)   Length: 21  in  Observations/Comments:        Apgars: 4  @ 1 minute /    9  @ 5 minutes   Infant Name:      Placenta & Cord         Placenta delivered  Spontaneous  at   7/5/2024  7:22 PM     Cord: 3 vessels  present.   Nuchal Cord?  yes; Number of nuchal loops present:  2    Cord blood obtained: Yes    Cord gases obtained:  No    Cord gas results: Venous:  No results found for: \"PHCVEN\", \"BECVEN\"    Arterial:  No results found for: \"PHCART\", \"BECART\"     Repair     Episiotomy: None     No    Lacerations: Yes  Laceration Information  Laceration Repaired?   Perineal: 2nd  Yes    Periurethral:  "      Labial:       Sulcus: right  Yes    Vaginal:       Cervical:         Suture used for repair: 2-0 chromic gut  Laceration Length for 3rd or 4th degree lacerations: Right vaginal wall laceration 10 cm.  Right labia lacerations 6 cm.  Second-degree tear 5 cm .   Estimated Blood Loss:  650     Quantitative Blood Loss:  650        Complications     Multiple vaginal lacerations.    Disposition     Mother to Mother Baby/Postpartum  in stable condition currently.  Baby to NBN  in stable condition currently.    Villa Flores MD  07/05/24  20:29 EDT

## 2024-07-06 NOTE — LACTATION NOTE
24 1330   Maternal Information   Person Making Referral patient   Maternal Reason for Referral breastfeeding currently;no prior breastfeeding experience   Infant Reason for Referral  infant;other (see comments)  (forceps delivery; swelling on head, abraisons on face)   Maternal Assessment   Breast Size Issue none   Breast Shape Bilateral:;round   Breast Density Bilateral:;soft   Nipples Bilateral:;retracting;everted   Left Nipple Symptoms intact;nontender   Right Nipple Symptoms intact;nontender   Maternal Infant Feeding   Maternal Emotional State receptive;tense  (multiple visitors in room and hallway)   Infant Positioning cradle;cross-cradle;clutch/football  (LC switched to LCC & LFB due to difficulty latching; few suckles in LCC & LFB, infant laying on nipple;)   Signs of Milk Transfer none noted  (unable to achieve latch/suckle during finger suckle assessment)   Pain with Feeding no  (per pt report)   Comfort Measures Before/During Feeding infant position adjusted;latch adjusted;maternal position adjusted;suction broken using finger   Milk Ejection Reflex other (see comments)  (hand expression confirmed; mom has been expressing colostrum due to leaking which began at 18wks)   Latch Assistance full assistance needed   Support Person Involvement invited to assist with feeding   Milk Expression/Equipment   Breast Pump Type double electric, personal  (Spectra, momcozy, hand pump at home; encouraged to be brought in)   Breast Pumping   Breast Pumping Interventions post-feed pumping encouraged  (for short/missed feedings, if supplementation is required, or if breastfeeding becomes too painful, to encourage breastmilk production)   Lactation Referrals   Lactation Referrals outpatient lactation program   Outpatient Lactation Program Lactation Follow-up Date/Time PRN     Completed breastfeeding education encouraging pt to achieve a deep, comfortable latch throughout breastfeeding, which should be at  least every 3 hours while giving baby stimulation for high quality transfer of breastmilk. Alternatively, pumping encouraged every three hours, or at baby's feeding times for optimal milk initiation/production. All questions answered at this time, PRN Lactation Consultant/Clinic contact encouraged

## 2024-07-07 VITALS
HEART RATE: 94 BPM | DIASTOLIC BLOOD PRESSURE: 83 MMHG | BODY MASS INDEX: 26.13 KG/M2 | HEIGHT: 62 IN | SYSTOLIC BLOOD PRESSURE: 123 MMHG | WEIGHT: 142 LBS | RESPIRATION RATE: 18 BRPM | TEMPERATURE: 98.2 F | OXYGEN SATURATION: 100 %

## 2024-07-07 PROBLEM — Z34.03 ENCOUNTER FOR SUPERVISION OF NORMAL FIRST PREGNANCY IN THIRD TRIMESTER: Status: RESOLVED | Noted: 2024-07-05 | Resolved: 2024-07-07

## 2024-07-07 PROBLEM — Z34.90 CURRENTLY PREGNANT: Status: RESOLVED | Noted: 2024-07-05 | Resolved: 2024-07-07

## 2024-07-07 PROBLEM — O36.63X0 EXCESSIVE FETAL GROWTH AFFECTING MANAGEMENT OF PREGNANCY IN THIRD TRIMESTER: Status: RESOLVED | Noted: 2024-07-05 | Resolved: 2024-07-07

## 2024-07-07 PROCEDURE — 0503F POSTPARTUM CARE VISIT: CPT

## 2024-07-07 RX ORDER — PSEUDOEPHEDRINE HCL 30 MG
100 TABLET ORAL 2 TIMES DAILY
Qty: 60 CAPSULE | Refills: 0 | Status: SHIPPED | OUTPATIENT
Start: 2024-07-07

## 2024-07-07 RX ORDER — FERROUS SULFATE 325(65) MG
325 TABLET ORAL 2 TIMES DAILY WITH MEALS
Qty: 60 TABLET | Refills: 2 | Status: SHIPPED | OUTPATIENT
Start: 2024-07-07

## 2024-07-07 RX ORDER — HYDROCODONE BITARTRATE AND ACETAMINOPHEN 5; 325 MG/1; MG/1
1 TABLET ORAL EVERY 6 HOURS PRN
Qty: 4 TABLET | Refills: 0 | Status: SHIPPED | OUTPATIENT
Start: 2024-07-07 | End: 2024-07-08

## 2024-07-07 RX ORDER — IBUPROFEN 600 MG/1
600 TABLET ORAL EVERY 6 HOURS PRN
Qty: 60 TABLET | Refills: 1 | Status: SHIPPED | OUTPATIENT
Start: 2024-07-07

## 2024-07-07 RX ORDER — ACETAMINOPHEN 325 MG/1
650 TABLET ORAL EVERY 6 HOURS PRN
Qty: 60 TABLET | Refills: 1 | Status: SHIPPED | OUTPATIENT
Start: 2024-07-07

## 2024-07-07 RX ADMIN — DOCUSATE SODIUM 100 MG: 100 CAPSULE, LIQUID FILLED ORAL at 08:42

## 2024-07-07 RX ADMIN — HYDROCODONE BITARTRATE AND ACETAMINOPHEN 1 TABLET: 10; 325 TABLET ORAL at 04:11

## 2024-07-07 RX ADMIN — IBUPROFEN 600 MG: 600 TABLET, FILM COATED ORAL at 12:16

## 2024-07-07 RX ADMIN — ACETAMINOPHEN 650 MG: 325 TABLET ORAL at 08:42

## 2024-07-07 RX ADMIN — IBUPROFEN 600 MG: 600 TABLET, FILM COATED ORAL at 04:11

## 2024-07-07 RX ADMIN — HYDROCODONE BITARTRATE AND ACETAMINOPHEN 1 TABLET: 10; 325 TABLET ORAL at 00:07

## 2024-07-07 RX ADMIN — WITCH HAZEL 1 PAD: 500 SOLUTION RECTAL; TOPICAL at 12:16

## 2024-07-07 NOTE — DISCHARGE SUMMARY
Discharge Summary     Stefany Garcia  : 1995  MRN: [id number]  CSN: [id number]    Date of Admission: 2024   Date of Discharge:  2024   Delivering Physician: Villa Flores        Admission Diagnosis: Currently pregnant [Z34.90]   Discharge Diagnosis: Same as above plus  Pregnancy at 39w0d - delivered   D/C Hospital Problem List:   * No active hospital problems. *         Procedures: 2024  - Vaginal, Forceps       Hospital Course  Patient is a 28 y.o.  who at 39w0d had a vaginal birth.  Her postpartum course was without complications.  On PPD # 2 she was ready for discharge.  She had normal lochia and pain well controlled with oral medications. She is requiring occasional hydrocodone for breakthrough pain. She has mild bilateral pitting edema. Denies headache, scotoma, or RUQ pain. Recommend elevating feet when seated and compression socks.     Infant  male  fetus weighing 3540 g (7 lb 12.9 oz)   Apgars -  4 @ 1 minute /  9 @ 5 minutes.    Discharge labs  Lab Results   Component Value Date    WBC 14.83 (H) 2024    HGB 7.0 (L) 2024    HCT 21.7 (L) 2024     2024       Discharge Medications     Discharge Medications        ASK your doctor about these medications        Instructions Start Date   acetaminophen 500 MG tablet  Commonly known as: TYLENOL   500 mg, Oral, Every 6 Hours PRN      calcium carbonate 500 MG chewable tablet  Commonly known as: TUMS   1 tablet, Oral, Daily      prenatal (CLASSIC) vitamin 28-0.8 MG tablet tablet  Generic drug: prenatal vitamin   Oral, Daily      prenatal vitamins 29-1 MG tablet tablet   1 tablet, Oral, Daily      promethazine 25 MG tablet  Commonly known as: PHENERGAN   25 mg, Oral, Every 6 Hours PRN               Discharge Disposition    Condition on Discharge: good   Follow-up: 6 weeks with  Dr Mark Jimenez, APRN  2024

## 2024-08-09 ENCOUNTER — TELEPHONE (OUTPATIENT)
Dept: OBSTETRICS AND GYNECOLOGY | Facility: CLINIC | Age: 29
End: 2024-08-09
Payer: COMMERCIAL

## 2024-08-09 NOTE — TELEPHONE ENCOUNTER
Hub staff attempted to follow warm transfer process and was unsuccessful     Caller: Jennifer Garcia    Relationship to patient: Self    Best call back number: [phone] CALL ANYTIME. IT IS OKAY TO LVM.    Patient is needing: PATIENT RETURNED CALL TO RESCHEDULE POSTPARTUM APPT ON 08/16/24. PATIENT GAVE OKAY TO RESCHEDULE TO 08/15/24. IF POSSIBLE LATE MORNING, EARLY AFTERNOON IS BEST. HUB UNABLE TO FIND OPENINGS ON 08/15/24.

## 2024-08-15 ENCOUNTER — POSTPARTUM VISIT (OUTPATIENT)
Dept: OBSTETRICS AND GYNECOLOGY | Facility: CLINIC | Age: 29
End: 2024-08-15
Payer: COMMERCIAL

## 2024-08-15 VITALS — SYSTOLIC BLOOD PRESSURE: 104 MMHG | BODY MASS INDEX: 22.86 KG/M2 | WEIGHT: 125 LBS | DIASTOLIC BLOOD PRESSURE: 68 MMHG

## 2024-08-15 DIAGNOSIS — N95.2 ATROPHIC VAGINITIS: Primary | ICD-10-CM

## 2024-08-15 RX ORDER — ESTRADIOL 0.1 MG/G
CREAM VAGINAL
Qty: 42.5 G | Refills: 3 | Status: SHIPPED | OUTPATIENT
Start: 2024-08-15

## 2024-08-15 NOTE — PROGRESS NOTES
Chief Complaint   Patient presents with    Postpartum Care       Postpartum Visit         Jennifer Garcia is a 29 y.o.  who presents today for a 6 week(s) postpartum check.     C/S: no     Vaginal, Forceps    Information for the patient's :  Ethan Garcia [[id number]]   2024   male   Ethan Garcia   3540 g (7 lb 12.9 oz)   Gestational Age: 39w0d        Baby Discharged: Discharged with Mom  Delivering Physician: Villa Flores MD    Her pregnancy was complicated by no known issues. The laceration was 2nd degree and is healing well. Patient describes vaginal bleeding as absent.  Patient is breastfeeding.  She desires  nothing  for contraception.      She would like to discuss the following complaints today: nothing.    Patient denies concerns for postpartum depression/anxiety. Patient denies  suicidal or homicidal ideation. Her postpartum depression screening questionnaire: 0. No treatment is indicated      Last Pap : 23. Results: negative. HPV: negative.   Last Completed Pap Smear            PAP SMEAR (Every 3 Years) Next due on 2023  LIQUID-BASED PAP SMEAR WITH HPV GENOTYPING REGARDLESS OF INTERPRETATION (STEPHEN,COR,MAD)                      The additional following portions of the patient's history were reviewed and updated as appropriate: allergies and current medications.    Review of Systems  All other systems reviewed and are negative.     I have reviewed and agree with the HPI, ROS, and historical information as entered above. Villa Flores MD      /68   Wt 56.7 kg (125 lb)   LMP 10/06/2023   Breastfeeding Yes   BMI 22.86 kg/m²     Physical Exam  Vitals and nursing note reviewed. Exam conducted with a chaperone present.   Genitourinary:     Labia:         Right: No rash, tenderness or lesion.         Left: No rash, tenderness or lesion.       Vagina: Normal. No lesions.      Cervix: No cervical motion tenderness,  discharge, lesion or cervical bleeding.      Uterus: Normal. Not enlarged, not fixed and not tender.       Adnexa:         Right: No mass or tenderness.          Left: No mass or tenderness.        Rectum: No external hemorrhoid.      Comments: Chaperone Present   lac are healed and we will use estrogen             Assessment and Plan    Problem List Items Addressed This Visit    None  Visit Diagnoses       Atrophic vaginitis    -  Primary    Relevant Medications    estradiol (ESTRACE VAGINAL) 0.1 MG/GM vaginal cream            S/p Vaginal delivery, 6 week(s) postpartum.  Doing well.    Return to normal physical activity.  No pelvic restrictions.   Baby doing well.  Breastfeeding going well.  No si/sx of postpartum depression  Discussed estrogen cream for vaginal dryness.  Will use vag estrogen ,   Contraception: contraceptive methods: None  Return in about 1 year (around 8/15/2025) for Annual physical.     Villa Flores MD  08/15/2024

## 2024-08-28 RX ORDER — FLUCONAZOLE 200 MG/1
TABLET ORAL
Qty: 16 TABLET | Refills: 0 | Status: SHIPPED | OUTPATIENT
Start: 2024-08-28

## 2025-03-03 ENCOUNTER — TELEPHONE (OUTPATIENT)
Dept: OBSTETRICS AND GYNECOLOGY | Facility: CLINIC | Age: 30
End: 2025-03-03
Payer: MEDICAID

## 2025-03-03 ENCOUNTER — LAB (OUTPATIENT)
Dept: OBSTETRICS AND GYNECOLOGY | Facility: CLINIC | Age: 30
End: 2025-03-03
Payer: MEDICAID

## 2025-03-03 DIAGNOSIS — Z32.00 UNCONFIRMED PREGNANCY: Primary | ICD-10-CM

## 2025-03-03 DIAGNOSIS — O20.9 VAGINAL BLEEDING AFFECTING EARLY PREGNANCY: ICD-10-CM

## 2025-03-03 NOTE — TELEPHONE ENCOUNTER
"Patient of Dr. De La Cruz; had VAVD per Dr. Flores 07/05/24. Patient has requested to schedule prenatal care with Dr. Flores; she has been scheduled for NOB visit with him 03/25/25.  Returned patient's call.   States she has been trying to conceive.  LMP 01/27/25 = 5w 0d; states last week she had bleeding like a normal period 02/24 & 02/25 then brown spotting for 3 days.   Denies any bleeding or spotting since 02/28/25.  Denies any pain or cramping.   High LH test at home today.  +UPT today; \"very positive\".  MBT is A positive.  Instructed on pelvic rest, no strenuous activity, and no heavy lifting. Advised to go to ER for heavy bleeding, passing clots golf ball size or larger, or severe pain, especially if localized to one side.  Patient agrees to come in today or tomorrow for HCG and Progesterone levels.   Patient v/u and agreed.     "

## 2025-03-03 NOTE — TELEPHONE ENCOUNTER
Patient has concerns about bleeding before positive UPT is scheduled in 3 weeks for nob but wanted to discuss bleeding

## 2025-03-03 NOTE — TELEPHONE ENCOUNTER
Provider: DR. WEINSTEIN    Caller: Jennifer Garcia    Relationship to Patient: Self    Pharmacy:     Phone Number: [phone]    Reason for Call: NEW OB APPOINTMENT    When was the patient last seen: 08.15.24    PATIENT CALLING IN TO SETUP NEW OB APPOINTMENT, LMP 01.27.25 (5W,0D) POSITIVE PREGNANCY TEST.   PATIENT DID STATE THAT SHE  HAD SOME BLEEDING AND SHE TOOK ANOTHER PREGNANCY AGAIN TODAY 03.03.25 AND IT CAME BACK POSITIVE    PATIENT CAN BE REACHED .[phone]    THANK YOU

## 2025-03-04 ENCOUNTER — TELEPHONE (OUTPATIENT)
Dept: OBSTETRICS AND GYNECOLOGY | Facility: CLINIC | Age: 30
End: 2025-03-04
Payer: MEDICAID

## 2025-03-04 DIAGNOSIS — Z3A.01 LESS THAN 8 WEEKS GESTATION OF PREGNANCY: ICD-10-CM

## 2025-03-04 DIAGNOSIS — R79.89 LOW SERUM PROGESTERONE: Primary | ICD-10-CM

## 2025-03-04 LAB
HCG INTACT+B SERPL-ACNC: 727 MIU/ML
PROGEST SERPL-MCNC: 8.6 NG/ML

## 2025-03-04 RX ORDER — PROGESTERONE 100 MG/1
100 CAPSULE ORAL
Qty: 30 CAPSULE | Refills: 2 | Status: SHIPPED | OUTPATIENT
Start: 2025-03-04

## 2025-03-04 NOTE — TELEPHONE ENCOUNTER
3/3/2025- LMP 01.27.25 5W,0D, . Progesterone 8.6.     Patient instructed to come in for blood draw tomorrow and begin vaginal progesterone QHS. She denies vaginal bleeding and pain.

## 2025-03-04 NOTE — TELEPHONE ENCOUNTER
PT is calling back because she is exclusively pumping and would like to know if Progesterone (Prometrium) 100 MG capsule  is safe while BF - or if she needs to start to formula feed her current baby. Please call to advise

## 2025-03-04 NOTE — TELEPHONE ENCOUNTER
Patient informed progesterone is safe to use while breast feeding. Recommended to wean off breast feeding due to new pregnancy. Patient VU.

## 2025-03-05 ENCOUNTER — LAB (OUTPATIENT)
Dept: OBSTETRICS AND GYNECOLOGY | Facility: CLINIC | Age: 30
End: 2025-03-05
Payer: MEDICAID

## 2025-03-06 ENCOUNTER — TELEPHONE (OUTPATIENT)
Dept: OBSTETRICS AND GYNECOLOGY | Facility: CLINIC | Age: 30
End: 2025-03-06
Payer: MEDICAID

## 2025-03-06 LAB — HCG INTACT+B SERPL-ACNC: NORMAL MIU/ML

## 2025-03-06 NOTE — TELEPHONE ENCOUNTER
See previous phone encounter from 03/03/25.  Returned patient's call.   Reviewed HCG result; has increased from 727 to 2071.   Patient v/u and denies any pain, bleeding, or other problems. Will keep NOB visit as scheduled and call for any problems.

## 2025-03-19 ENCOUNTER — TELEPHONE (OUTPATIENT)
Dept: OBSTETRICS AND GYNECOLOGY | Facility: CLINIC | Age: 30
End: 2025-03-19
Payer: COMMERCIAL

## 2025-03-19 DIAGNOSIS — O21.9 NAUSEA AND VOMITING DURING PREGNANCY: Primary | ICD-10-CM

## 2025-03-19 RX ORDER — PROMETHAZINE HYDROCHLORIDE 25 MG/1
25 TABLET ORAL EVERY 6 HOURS PRN
Qty: 30 TABLET | Refills: 0 | Status: SHIPPED | OUTPATIENT
Start: 2025-03-19

## 2025-03-19 NOTE — TELEPHONE ENCOUNTER
Pt reports NV of 4-5 times a day. She is able to keep down some fluids and food in between. She does not feel like she is getting dehydrated. Will send in phenergan which she may cut in half if it makes her too sleepy. Reviewed S/S dehydration and when to go in for fluids or call back. She VU.

## 2025-03-25 ENCOUNTER — INITIAL PRENATAL (OUTPATIENT)
Dept: OBSTETRICS AND GYNECOLOGY | Facility: CLINIC | Age: 30
End: 2025-03-25
Payer: COMMERCIAL

## 2025-03-25 VITALS — WEIGHT: 128 LBS | BODY MASS INDEX: 23.41 KG/M2 | SYSTOLIC BLOOD PRESSURE: 104 MMHG | DIASTOLIC BLOOD PRESSURE: 68 MMHG

## 2025-03-25 DIAGNOSIS — Z34.91 FIRST TRIMESTER PREGNANCY: Primary | ICD-10-CM

## 2025-03-25 NOTE — PROGRESS NOTES
Initial ob visit     CC- Here for care of pregnancy        Jennifer Garcia is a 29 y.o. female, , who presents for her first obstetrical visit.  Patient's last menstrual period was 2025.. Her NOLBERTO is 11/3/2025, by Last Menstrual Period. Current GA is 8w1d.     Initial positive test date : 2025, UPT        Her periods are every 26-28 days.  Prior obstetric issues: none  Patient's past medical history is significant for:  none .  Family history of genetic issues (includes FOB): none  Prior infections concerning in pregnancy (Rash, fever in last 2 weeks): No  Varicella Hx - vaccinated  Prior testing for Cystic Fibrosis Carrier or Sickle Cell Trait- none  Prepregnancy BMI - Body mass index is 23.41 kg/m².  History of STD: no  Hx of HSV for patient or partner: no  Ultrasound Today: yes    OB History    Para Term  AB Living   2 1 1 0 0 1   SAB IAB Ectopic Molar Multiple Live Births   0 0 0 0 0 1      # Outcome Date GA Lbr Joss/2nd Weight Sex Type Anes PTL Lv   2 Current            1 Term 24 39w0d 13:02 / 04:26 3540 g (7 lb 12.9 oz) M Vag-Forceps EPI N JAMAR      Complications: Failure to Progress in Second Stage       Additional Pertinent History   Last Pap : 2023 Result: negative HPV: negative     Last Completed Pap Smear            Upcoming       PAP SMEAR (Every 3 Years) Next due on 2023  LIQUID-BASED PAP SMEAR WITH HPV GENOTYPING REGARDLESS OF INTERPRETATION (STEPHEN,COR,MAD)                          History of abnormal Pap smear: no  Family history of uterine, colon, breast, or ovarian cancer: no  Feelings of Anxiety or Depression: no  Tobacco Usage?: No   Alcohol/Drug Use?: NO  Over the age of 35 at delivery: no  Genetic Screening: desires cell free DNA with gender    PMH    Current Outpatient Medications:     acetaminophen (TYLENOL) 325 MG tablet, Take 2 tablets by mouth Every 6 Hours As Needed for Mild Pain unrelieved by ibuprofen. (Stagger doses  3 hours after dose of ibuprofen), Disp: 60 tablet, Rfl: 1    Progesterone (Prometrium) 100 MG capsule, Insert 1 capsule into the vagina every night at bedtime., Disp: 30 capsule, Rfl: 2    promethazine (PHENERGAN) 25 MG tablet, Take 1 tablet by mouth Every 6 (Six) Hours As Needed for Nausea or Vomiting., Disp: 30 tablet, Rfl: 0    docusate sodium 100 MG capsule, Take 1 capsule by mouth 2 (Two) Times a Day., Disp: 60 capsule, Rfl: 0    estradiol (ESTRACE VAGINAL) 0.1 MG/GM vaginal cream, 2 gm vag qhs x 2 weeks then 2x  aweek, Disp: 42.5 g, Rfl: 3    ferrous sulfate 325 (65 FE) MG tablet, Take 1 tablet by mouth 2 (Two) Times a Day With Meals., Disp: 60 tablet, Rfl: 2    fluconazole (Diflucan) 200 MG tablet, Take 400MG for the first day, then 200MG daily thereafter., Disp: 16 tablet, Rfl: 0    ibuprofen (ADVIL,MOTRIN) 600 MG tablet, Take 1 tablet by mouth Every 6 (Six) Hours As Needed for Mild Pain, Disp: 60 tablet, Rfl: 1     No past medical history on file.     Past Surgical History:   Procedure Laterality Date    WISDOM TOOTH EXTRACTION         Review of Systems   Review of Systems    Patient Reports:  none  Patient Denies:excessive nausea , excessive vomiting, and vaginal bleeding  All systems reviewed and otherwise normal.    I have reviewed and agree with the HPI, ROS, and historical information as entered above. Villa Flores MD      /68   Wt 58.1 kg (128 lb)   LMP 01/27/2025   BMI 23.41 kg/m²     The additional following portions of the patient's history were reviewed and updated as appropriate: allergies, current medications, past family history, past medical history, past social history, and past surgical history.    Physical Exam  General:  well developed; well nourished  no acute distress  mentation appropriate   Chest/Respiratory: No labored breathing, normal respiratory effort, normal appearance, no respiratory noises noted   Heart:  normal rate, regular rhythm,  no murmurs, rubs, or gallops    Thyroid: normal to inspection and palpation   Breasts:  Not performed.   Abdomen: soft, non-tender; no masses  no umbilical or inguinal hernias are present  no hepato-splenomegaly   Pelvis: Uterus:  symmetrically enlarged, consisent with 7 weeks size;        Assessment and Plan    Problem List Items Addressed This Visit    None  Visit Diagnoses         First trimester pregnancy    -  Primary    Relevant Orders    Obstetric Panel    HIV-1 / O / 2 Ag / Antibody    Urine Culture - Urine, Urine, Clean Catch    Urinalysis With Microscopic - Urine, Clean Catch    Chlamydia trachomatis, Neisseria gonorrhoeae, PCR - Urine, Urine, Clean Catch    Urine Drug Screen - Urine, Clean Catch    [id number] PLUS Core+SCA+ESS - Blood,            Pregnancy at 8w1d  Reviewed routine prenatal care with the office and educational materials given  Lab(s) Ordered  Discussed options for genetic testing including first trimester nuchal translucency screen, genetic disease carrier testing, quadruple screen, and NIPT  Return in about 1 month (around 4/25/2025).      Villa Flores MD  03/25/2025

## 2025-04-07 ENCOUNTER — LAB (OUTPATIENT)
Dept: OBSTETRICS AND GYNECOLOGY | Facility: CLINIC | Age: 30
End: 2025-04-07
Payer: COMMERCIAL

## 2025-04-07 PROBLEM — O36.63X0 EXCESSIVE FETAL GROWTH AFFECTING MANAGEMENT OF PREGNANCY IN THIRD TRIMESTER: Status: RESOLVED | Noted: 2024-06-17 | Resolved: 2025-04-07

## 2025-04-08 LAB
ABO GROUP BLD: NORMAL
AMPHETAMINES UR QL SCN: NEGATIVE NG/ML
APPEARANCE UR: ABNORMAL
BACTERIA #/AREA URNS HPF: ABNORMAL /HPF
BARBITURATES UR QL SCN: NEGATIVE NG/ML
BASOPHILS # BLD AUTO: 0 X10E3/UL (ref 0–0.2)
BASOPHILS NFR BLD AUTO: 1 %
BENZODIAZ UR QL SCN: NEGATIVE NG/ML
BILIRUB UR QL STRIP: NEGATIVE
BLD GP AB SCN SERPL QL: NEGATIVE
BZE UR QL SCN: NEGATIVE NG/ML
CANNABINOIDS UR QL SCN: NEGATIVE NG/ML
CASTS URNS MICRO: ABNORMAL
COLOR UR: ABNORMAL
CREAT UR-MCNC: 301.6 MG/DL (ref 20–300)
EOSINOPHIL # BLD AUTO: 0.1 X10E3/UL (ref 0–0.4)
EOSINOPHIL NFR BLD AUTO: 1 %
EPI CELLS #/AREA URNS HPF: ABNORMAL /HPF
ERYTHROCYTE [DISTWIDTH] IN BLOOD BY AUTOMATED COUNT: 12.6 % (ref 11.7–15.4)
GLUCOSE UR QL STRIP: NEGATIVE
HBV SURFACE AG SERPL QL IA: NEGATIVE
HCT VFR BLD AUTO: 40.5 % (ref 34–46.6)
HCV IGG SERPL QL IA: NON REACTIVE
HGB BLD-MCNC: 13.7 G/DL (ref 11.1–15.9)
HGB UR QL STRIP: NEGATIVE
HIV 1+2 AB+HIV1 P24 AG SERPL QL IA: NON REACTIVE
IMM GRANULOCYTES # BLD AUTO: 0 X10E3/UL (ref 0–0.1)
IMM GRANULOCYTES NFR BLD AUTO: 0 %
KETONES UR QL STRIP: ABNORMAL
LABORATORY COMMENT REPORT: ABNORMAL
LEUKOCYTE ESTERASE UR QL STRIP: NEGATIVE
LYMPHOCYTES # BLD AUTO: 2 X10E3/UL (ref 0.7–3.1)
LYMPHOCYTES NFR BLD AUTO: 23 %
MCH RBC QN AUTO: 30.2 PG (ref 26.6–33)
MCHC RBC AUTO-ENTMCNC: 33.8 G/DL (ref 31.5–35.7)
MCV RBC AUTO: 89 FL (ref 79–97)
METHADONE UR QL SCN: NEGATIVE NG/ML
MONOCYTES # BLD AUTO: 0.4 X10E3/UL (ref 0.1–0.9)
MONOCYTES NFR BLD AUTO: 5 %
NEUTROPHILS # BLD AUTO: 6.1 X10E3/UL (ref 1.4–7)
NEUTROPHILS NFR BLD AUTO: 70 %
NITRITE UR QL STRIP: NEGATIVE
OPIATES UR QL SCN: NEGATIVE NG/ML
OXYCODONE+OXYMORPHONE UR QL SCN: NEGATIVE NG/ML
PCP UR QL: NEGATIVE NG/ML
PH UR STRIP: 6 [PH] (ref 5–8)
PH UR: 5.8 [PH] (ref 4.5–8.9)
PLATELET # BLD AUTO: 300 X10E3/UL (ref 150–450)
PROPOXYPH UR QL SCN: NEGATIVE NG/ML
PROT UR QL STRIP: ABNORMAL
RBC # BLD AUTO: 4.53 X10E6/UL (ref 3.77–5.28)
RBC #/AREA URNS HPF: ABNORMAL /HPF
RH BLD: POSITIVE
RPR SER QL: NON REACTIVE
RUBV IGG SERPL IA-ACNC: 5.99 INDEX
SP GR UR STRIP: >1.03 (ref 1–1.03)
UROBILINOGEN UR STRIP-MCNC: ABNORMAL MG/DL
WBC # BLD AUTO: 8.7 X10E3/UL (ref 3.4–10.8)
WBC #/AREA URNS HPF: ABNORMAL /HPF

## 2025-04-09 LAB
BACTERIA UR CULT: NO GROWTH
BACTERIA UR CULT: NORMAL
C TRACH RRNA SPEC QL NAA+PROBE: NEGATIVE
N GONORRHOEA RRNA SPEC QL NAA+PROBE: NEGATIVE

## 2025-04-11 LAB
5P15 DELETION (CRI-DU-CHAT): NOT DETECTED
CFDNA.FET/CFDNA.TOTAL SFR FETUS: NORMAL %
CITATION REF LAB TEST: NORMAL
FET 13+18+21+X+Y ANEUP PLAS.CFDNA: NEGATIVE
FET 1P36 DEL RISK WBC.DNA+CFDNA QL: NOT DETECTED
FET 22Q11.2 DEL RISK WBC.DNA+CFDNA QL: NOT DETECTED
FET CHR 11Q23 DEL PLAS.CFDNA QL: NOT DETECTED
FET CHR 15Q11 DEL PLAS.CFDNA QL: NOT DETECTED
FET CHR 21 TS PLAS.CFDNA QL: NEGATIVE
FET CHR 4P16 DEL PLAS.CFDNA QL: NOT DETECTED
FET CHR 8Q24 DEL PLAS.CFDNA QL: NOT DETECTED
FET MS X RISK WBC.DNA+CFDNA QL: NOT DETECTED
FET SEX PLAS.CFDNA DOSAGE CFDNA: NORMAL
FET TS 13 RISK PLAS.CFDNA QL: NEGATIVE
FET TS 18 RISK WBC.DNA+CFDNA QL: NEGATIVE
FET X + Y ANEUP RISK PLAS.CFDNA SEQ-IMP: NOT DETECTED
GA EST FROM CONCEPTION DATE: NORMAL D
GESTATIONAL AGE > 9:: YES
LAB DIRECTOR NAME PROVIDER: NORMAL
LAB DIRECTOR NAME PROVIDER: NORMAL
LABORATORY COMMENT REPORT: NORMAL
LIMITATIONS OF THE TEST: NORMAL
NEGATIVE PREDICTIVE VALUE: NORMAL
PERFORMANCE CHARACTERISTICS: NORMAL
POSITIVE PREDICTIVE VALUE: NORMAL
REF LAB TEST METHOD: NORMAL
SERVICE CMNT-IMP: NORMAL
TEST PERFORMANCE INFO SPEC: NORMAL
TRIOSOMY 16: NOT DETECTED
TRISOMY 22: NOT DETECTED

## 2025-04-14 ENCOUNTER — TELEPHONE (OUTPATIENT)
Dept: OBSTETRICS AND GYNECOLOGY | Facility: CLINIC | Age: 30
End: 2025-04-14

## 2025-04-25 ENCOUNTER — ROUTINE PRENATAL (OUTPATIENT)
Dept: OBSTETRICS AND GYNECOLOGY | Facility: CLINIC | Age: 30
End: 2025-04-25
Payer: COMMERCIAL

## 2025-04-25 VITALS — BODY MASS INDEX: 22.13 KG/M2 | DIASTOLIC BLOOD PRESSURE: 74 MMHG | SYSTOLIC BLOOD PRESSURE: 110 MMHG | WEIGHT: 121 LBS

## 2025-04-25 DIAGNOSIS — Z34.81 PRENATAL CARE, SUBSEQUENT PREGNANCY, FIRST TRIMESTER: Primary | ICD-10-CM

## 2025-04-25 RX ORDER — ONDANSETRON 4 MG/1
4 TABLET, ORALLY DISINTEGRATING ORAL EVERY 8 HOURS PRN
Qty: 30 TABLET | Refills: 2 | Status: SHIPPED | OUTPATIENT
Start: 2025-04-25

## 2025-04-25 NOTE — PROGRESS NOTES
OB FOLLOW UP  CC- Here for care of pregnancy        Jennifer Garcia is a 29 y.o.  12w0d patient being seen today for her obstetrical follow up visit. Patient reports headaches that do not go away with rest/tylenol.     Her prenatal care is complicated by (and status) :   Patient Active Problem List   Diagnosis    Screening for cervical cancer    Infertility management    Pregnancy    Nausea/vomiting in pregnancy    History of delivery of macrosomal infant    History of forceps delivery in prior pregnancy, currently pregnant       Genetic testing?: already completed and was normal.  NOB labs reviewed  Ultrasound Today: No    ROS -   Patient Denies: leaking of fluid, vaginal bleeding, dysuria, excessive vomiting, and more than 6 contractions per hour  Fetal Movement: absent  Other than what is documented in the HPI, all other systems reviewed and are negative.     The additional following portions of the patient's history were reviewed and updated as appropriate: allergies and current medications.    I have reviewed and agree with the HPI, ROS, and historical information as entered above. Villa Flores MD          /74   Wt 54.9 kg (121 lb)   LMP 2025   BMI 22.13 kg/m²         EXAM:     Prenatal Vitals  BP: 110/74  Weight: 54.9 kg (121 lb)   Fetal Heart Rate: 160                  Assessment and Plan    Problem List Items Addressed This Visit    None  Visit Diagnoses         Prenatal care, subsequent pregnancy, first trimester    -  Primary    Relevant Medications    ondansetron ODT (ZOFRAN-ODT) 4 MG disintegrating tablet            Pregnancy at 12w0d  Labs reviewed from New OB Visit.  Counseled on genetic testing, carrier status and option for NT screen  Activity and Exercise discussed.  Patient is on Prenatal vitamins  Return in about 1 month (around 2025).    Villa Flores MD  2025

## 2025-05-23 ENCOUNTER — ROUTINE PRENATAL (OUTPATIENT)
Dept: OBSTETRICS AND GYNECOLOGY | Facility: CLINIC | Age: 30
End: 2025-05-23
Payer: COMMERCIAL

## 2025-05-23 VITALS — SYSTOLIC BLOOD PRESSURE: 106 MMHG | DIASTOLIC BLOOD PRESSURE: 68 MMHG | BODY MASS INDEX: 21.69 KG/M2 | WEIGHT: 118.6 LBS

## 2025-05-23 DIAGNOSIS — Z34.92 SECOND TRIMESTER PREGNANCY: Primary | ICD-10-CM

## 2025-05-23 LAB
GLUCOSE UR STRIP-MCNC: NEGATIVE MG/DL
PROT UR STRIP-MCNC: NEGATIVE MG/DL

## 2025-05-23 NOTE — PROGRESS NOTES
OB FOLLOW UP  CC- Here for care of pregnancy        Jennifer Garcia is a 29 y.o.  16w0d patient being seen today for her obstetrical follow up visit. Patient reports no complaints    Her prenatal care is complicated by (and status) : see below.  Patient Active Problem List   Diagnosis    Screening for cervical cancer    Infertility management    Pregnancy    Nausea/vomiting in pregnancy    History of delivery of macrosomal infant    History of forceps delivery in prior pregnancy, currently pregnant       Ultrasound Today: No    AFP: desires    ROS -   Patient Denies: leaking of fluid, vaginal bleeding, dysuria, excessive vomiting, and more than 6 contractions per hour  Fetal Movement: present  Other than what is documented in the HPI, all other systems reviewed and are negative.       The additional following portions of the patient's history were reviewed and updated as appropriate: allergies and current medications.      I have reviewed and agree with the HPI, ROS, and historical information as entered above. Villa Flores MD          EXAM:     Prenatal Vitals  BP: 106/68  Weight: 53.8 kg (118 lb 9.6 oz)   Fetal Heart Rate: 144         Urine Glucose Read-only: Negative  Urine Protein Read-only: Negative           Assessment and Plan    Problem List Items Addressed This Visit    None  Visit Diagnoses         Second trimester pregnancy    -  Primary    Relevant Orders    POC Urinalysis Dipstick (Completed)    Alpha Fetoprotein, Maternal    US Ob 14 + Weeks Single or First Gestation            Pregnancy at 16w0d  Fetal status reassuring.   Counseled on MSAFP alone in relation to OTD and placental issues.    Anatomy scan next visit.   Activity and Exercise discussed.  Patient is on Prenatal vitamins  Return in about 1 month (around 2025) for us then .    Villa Flores MD  2025

## 2025-05-25 LAB
AFP INTERP SERPL-IMP: NORMAL
AFP INTERP SERPL-IMP: NORMAL
AFP MOM SERPL: 0.72
AFP SERPL-MCNC: 27.5 NG/ML
AGE AT DELIVERY: 28.2 YR
GA METHOD: NORMAL
GA: 16 WEEKS
IDDM PATIENT QL: NO
LABORATORY COMMENT REPORT: NORMAL
MULTIPLE PREGNANCY: NO
NEURAL TUBE DEFECT RISK FETUS: NORMAL %
RESULT: NORMAL

## 2025-05-28 ENCOUNTER — TELEPHONE (OUTPATIENT)
Dept: OBSTETRICS AND GYNECOLOGY | Facility: CLINIC | Age: 30
End: 2025-05-28
Payer: COMMERCIAL

## 2025-06-20 ENCOUNTER — ROUTINE PRENATAL (OUTPATIENT)
Dept: OBSTETRICS AND GYNECOLOGY | Facility: CLINIC | Age: 30
End: 2025-06-20
Payer: COMMERCIAL

## 2025-06-20 VITALS — WEIGHT: 120.8 LBS | BODY MASS INDEX: 22.09 KG/M2 | SYSTOLIC BLOOD PRESSURE: 110 MMHG | DIASTOLIC BLOOD PRESSURE: 70 MMHG

## 2025-06-20 DIAGNOSIS — Z34.90 PRENATAL CARE, ANTEPARTUM, UNSPECIFIED GRAVIDITY: Primary | ICD-10-CM

## 2025-06-20 LAB
GLUCOSE UR STRIP-MCNC: NEGATIVE MG/DL
PROT UR STRIP-MCNC: NEGATIVE MG/DL

## 2025-06-20 RX ORDER — PRENATAL VIT NO.126/IRON/FOLIC 28MG-0.8MG
TABLET ORAL DAILY
COMMUNITY

## 2025-06-20 NOTE — PROGRESS NOTES
OB FOLLOW UP  CC- Here for care of pregnancy        Jennifer Garcia is a 29 y.o.  20w0d patient being seen today for her obstetrical follow up visit. Patient reports no complaints.     Her prenatal care is complicated by (and status) : see below.  Patient Active Problem List   Diagnosis    Screening for cervical cancer    Infertility management    Pregnancy    Nausea/vomiting in pregnancy    History of delivery of macrosomal infant    History of forceps delivery in prior pregnancy, currently pregnant       Ultrasound Today: Yes  AFP was already completed and was normal.    ROS -     Patient Denies: leaking of fluid, vaginal bleeding, dysuria, excessive vomiting, and more than 6 contractions per hour  Fetal Movement : Yes  Other than what is documented in the HPI, all other systems reviewed and are negative.       The additional following portions of the patient's history were reviewed and updated as appropriate: allergies, current medications, past family history, past medical history, past social history, past surgical history, and problem list.      I have reviewed and agree with the HPI, ROS, and historical information as entered above. Villa Flores MD      /70   Wt 54.8 kg (120 lb 12.8 oz)   LMP 2025   BMI 22.09 kg/m²       EXAM:     Prenatal Vitals  BP: 110/70  Weight: 54.8 kg (120 lb 12.8 oz)   Fetal Heart Rate: 157 bpm          Urine Glucose Read-only: Negative  Urine Protein Read-only: Negative       Assessment and Plan    Problem List Items Addressed This Visit    None  Visit Diagnoses         Prenatal care, antepartum, unspecified     -  Primary    Relevant Orders    POC Urinalysis Dipstick (Completed)          Need cardiac vv next time   Pregnancy at 20w0d  Anatomy scan today is incomplete, follow up in 4 weeks for additional views. Anatomy that was visualized was within normal limits.  Fetal status reassuring.   Activity and Exercise discussed.  U/S ordered at  follow up  Patient is on Prenatal vitamins  Return in about 1 month (around 7/20/2025).      Villa Flores MD  06/20/2025

## 2025-07-18 ENCOUNTER — ROUTINE PRENATAL (OUTPATIENT)
Dept: OBSTETRICS AND GYNECOLOGY | Facility: CLINIC | Age: 30
End: 2025-07-18
Payer: COMMERCIAL

## 2025-07-18 VITALS — DIASTOLIC BLOOD PRESSURE: 62 MMHG | BODY MASS INDEX: 23.08 KG/M2 | SYSTOLIC BLOOD PRESSURE: 100 MMHG | WEIGHT: 126.2 LBS

## 2025-07-18 DIAGNOSIS — Z34.92 SECOND TRIMESTER PREGNANCY: Primary | ICD-10-CM

## 2025-07-18 LAB
GLUCOSE UR STRIP-MCNC: NEGATIVE MG/DL
PROT UR STRIP-MCNC: NEGATIVE MG/DL

## 2025-07-18 NOTE — PROGRESS NOTES
Villa Flores MD      OB FOLLOW UP  CC- Here for care of pregnancy        Jennifer Garcia is a 29 y.o.  24w0d patient being seen today for her obstetrical follow up visit. Patient reports no complaints.     Her prenatal care is complicated by (and status) : see below.  Patient Active Problem List   Diagnosis    Screening for cervical cancer    Infertility management    Pregnancy    Nausea/vomiting in pregnancy    History of delivery of macrosomal infant    History of forceps delivery in prior pregnancy, currently pregnant       Ultrasound Today: Yes  Reviewed 1 hr glucose testing and TDAP next visit.    ROS -   Patient Denies: leaking of fluid, vaginal bleeding, dysuria, excessive vomiting, and more than 6 contractions per hour  Fetal Movement : normal  Other than what is documented in the HPI, all other systems reviewed and are negative.       The additional following portions of the patient's history were reviewed and updated as appropriate: allergies and current medications.      I have reviewed and agree with the HPI, ROS, and historical information as entered above. >me      /62   Wt 57.2 kg (126 lb 3.2 oz)   LMP 2025   BMI 23.08 kg/m²       EXAM:     Prenatal Vitals  BP: 100/62  Weight: 57.2 kg (126 lb 3.2 oz)   Fetal Heart Rate: 157               Urine Glucose Read-only: Negative  Urine Protein Read-only: Negative       Assessment and Plan    Problem List Items Addressed This Visit    None  Visit Diagnoses         Second trimester pregnancy    -  Primary    Relevant Orders    POC Urinalysis Dipstick (Completed)    Antibody Screen    CBC (No Diff)    Gestational Screen 1 Hr ([facility])    RPR Qualitative with Reflex to Quant            Pregnancy at 24w0d  Fetal status reassuring.  anatomy scan completed today and within normal limits.  1 hour gtt, CBC, Antibody screen, TDAP, and RPR next visit. Instructions given  Discussed/encouraged TDAP vaccination after 28 weeks  Activity and  Exercise discussed.  Return in about 1 month (around 8/18/2025) for bs then .      Villa Flores MD  07/18/2025

## 2025-08-22 ENCOUNTER — ROUTINE PRENATAL (OUTPATIENT)
Dept: OBSTETRICS AND GYNECOLOGY | Facility: CLINIC | Age: 30
End: 2025-08-22
Payer: COMMERCIAL

## 2025-08-22 VITALS — WEIGHT: 131 LBS | BODY MASS INDEX: 23.96 KG/M2 | DIASTOLIC BLOOD PRESSURE: 70 MMHG | SYSTOLIC BLOOD PRESSURE: 110 MMHG

## 2025-08-22 DIAGNOSIS — Z34.83 PRENATAL CARE, SUBSEQUENT PREGNANCY, THIRD TRIMESTER: Primary | ICD-10-CM

## 2025-08-22 LAB
ERYTHROCYTE [DISTWIDTH] IN BLOOD BY AUTOMATED COUNT: 11.5 % (ref 12.3–15.4)
GLUCOSE 1H P 50 G GLC PO SERPL-MCNC: 101 MG/DL (ref 65–139)
GLUCOSE UR STRIP-MCNC: NEGATIVE MG/DL
HCT VFR BLD AUTO: 36 % (ref 34–46.6)
HGB BLD-MCNC: 12 G/DL (ref 12–15.9)
MCH RBC QN AUTO: 30 PG (ref 26.6–33)
MCHC RBC AUTO-ENTMCNC: 33.3 G/DL (ref 31.5–35.7)
MCV RBC AUTO: 90 FL (ref 79–97)
PLATELET # BLD AUTO: 248 10*3/MM3 (ref 140–450)
PROT UR STRIP-MCNC: NEGATIVE MG/DL
RBC # BLD AUTO: 4 10*6/MM3 (ref 3.77–5.28)
WBC # BLD AUTO: 9.66 10*3/MM3 (ref 3.4–10.8)

## 2025-08-23 LAB
BLD GP AB SCN SERPL QL: NEGATIVE
RPR SER QL: NON REACTIVE